# Patient Record
Sex: FEMALE | Race: WHITE | NOT HISPANIC OR LATINO | Employment: OTHER | URBAN - METROPOLITAN AREA
[De-identification: names, ages, dates, MRNs, and addresses within clinical notes are randomized per-mention and may not be internally consistent; named-entity substitution may affect disease eponyms.]

---

## 2017-01-03 ENCOUNTER — PRENATAL OFFICE VISIT (OUTPATIENT)
Dept: OBGYN | Facility: CLINIC | Age: 37
End: 2017-01-03
Payer: COMMERCIAL

## 2017-01-03 ENCOUNTER — TRANSFERRED RECORDS (OUTPATIENT)
Dept: HEALTH INFORMATION MANAGEMENT | Facility: CLINIC | Age: 37
End: 2017-01-03

## 2017-01-03 VITALS — BODY MASS INDEX: 26.15 KG/M2 | WEIGHT: 167 LBS | DIASTOLIC BLOOD PRESSURE: 68 MMHG | SYSTOLIC BLOOD PRESSURE: 100 MMHG

## 2017-01-03 DIAGNOSIS — O34.219 PREVIOUS CESAREAN DELIVERY, ANTEPARTUM: ICD-10-CM

## 2017-01-03 DIAGNOSIS — Z3A.23 23 WEEKS GESTATION OF PREGNANCY: ICD-10-CM

## 2017-01-03 DIAGNOSIS — O09.91 SUPERVISION OF HIGH RISK PREGNANCY IN FIRST TRIMESTER: Primary | ICD-10-CM

## 2017-01-03 PROCEDURE — 99207 ZZC PRENATAL VISIT: CPT | Performed by: OBSTETRICS & GYNECOLOGY

## 2017-01-03 NOTE — PROGRESS NOTES
Doing well today.  NO issues/concerns  +FM  Occ BH contractions; no vb/spotting/lof  RTC 4wks  Tubal ligation consent signed and scanned into chart

## 2017-01-03 NOTE — MR AVS SNAPSHOT
After Visit Summary   1/3/2017    Yas Beck    MRN: [id number]           Patient Information     Date Of Birth          1980        Visit Information        Provider Department      1/3/2017 9:30 AM Kayla Campos MD [facility] [place]        Today's Diagnoses     Supervision of high risk pregnancy in second trimester    -  1     Previous  delivery, antepartum         23 weeks gestation of pregnancy            Follow-ups after your visit        Follow-up notes from your care team     Return in about 4 weeks (around 2017) for Routine Prenatal Visit.      Your next 10 appointments already scheduled     2017  8:30 AM   Glucose Tolerance Test with [facility]   [facility] [place] ([facility] [place])    [address]  [address]  [place] [address] [phone]            2017  8:40 AM   ESTABLISHED PRENATAL with Kayla Campos MD   [facility] [place] ([facility] [place])    [address]  [address]  [place] [address] [phone]            Feb 10, 2017  9:30 AM   ESTABLISHED PRENATAL with Kayla Campos MD   [facility] [place] ([facility] [place])    [address]  [address]  [place] [address] [phone]            2017   Procedure with Kayla Campos MD    Birthplace (--)    [address] [place] [address], [address]  [place] [address] [phone]              Who to contact     If you have questions or need follow up information about today's clinic visit or your schedule please contact [facility] [place] directly at [phone].  Normal or non-critical lab and imaging results will be communicated to you by MyChart, letter or phone within 4 business days after the clinic has received the results. If you do not hear from us within 7 days, please contact the clinic through MyChart or phone. If you have a  "critical or abnormal lab result, we will notify you by phone as soon as possible.  Submit refill requests through [employer] or call your pharmacy and they will forward the refill request to us. Please allow 3 business days for your refill to be completed.          Additional Information About Your Visit        [employer] Information     [employer] lets you send messages to your doctor, view your test results, renew your prescriptions, schedule appointments and more. To sign up, go to [email or web address] . Click on \"Log in\" on the left side of the screen, which will take you to the Welcome page. Then click on \"Sign up Now\" on the right side of the page.     You will be asked to enter the access code listed below, as well as some personal information. Please follow the directions to create your username and password.     Your access code is: [email or web address]  Expires: 4/3/2017 10:23 AM     Your access code will  in 90 days. If you need help or a new code, please call your [place] clinic or [phone].        Care EveryWhere ID     This is your Care EveryWhere ID. This could be used by other organizations to access your [place] medical records  [id number]        Your Vitals Were     Last Period                   2016            Blood Pressure from Last 3 Encounters:   17 100/68   16 112/68   16 118/78    Weight from Last 3 Encounters:   17 167 lb (75.751 kg)   16 162 lb (73.483 kg)   16 162 lb (73.483 kg)              Today, you had the following     No orders found for display       Primary Care Provider Office Phone # Fax #    Lalo Lopez [phone]       [facility] [address] [address]        Thank you!     Thank you for choosing [facility] [place]  for your care. Our goal is always to provide you with excellent care. Hearing back from our patients is one way we can continue to improve our " services. Please take a few minutes to complete the written survey that you may receive in the mail after your visit with us. Thank you!             Your Updated Medication List - Protect others around you: Learn how to safely use, store and throw away your medicines at www.disposemymeds.org.          This list is accurate as of: 1/3/17 11:18 AM.  Always use your most recent med list.                   Brand Name Dispense Instructions for use    MAGNESIUM OXIDE PO          prenatal multivitamin  plus iron 27-0.8 MG Tabs per tablet      Take 1 tablet by mouth daily.    Indications: Pregnancy       vitamin B complex with vitamin C Tabs tablet      Take 1 tablet by mouth daily

## 2017-01-26 ENCOUNTER — PRENATAL OFFICE VISIT (OUTPATIENT)
Dept: OBGYN | Facility: CLINIC | Age: 37
End: 2017-01-26
Payer: COMMERCIAL

## 2017-01-26 VITALS — WEIGHT: 169 LBS | DIASTOLIC BLOOD PRESSURE: 66 MMHG | BODY MASS INDEX: 26.46 KG/M2 | SYSTOLIC BLOOD PRESSURE: 110 MMHG

## 2017-01-26 DIAGNOSIS — Z23 IMMUNIZATION DUE: ICD-10-CM

## 2017-01-26 DIAGNOSIS — O09.91 SUPERVISION OF HIGH RISK PREGNANCY IN FIRST TRIMESTER: Primary | ICD-10-CM

## 2017-01-26 DIAGNOSIS — Z36.9 ENCOUNTER FOR ANTENATAL SCREENING OF MOTHER: Primary | ICD-10-CM

## 2017-01-26 DIAGNOSIS — Z3A.26 26 WEEKS GESTATION OF PREGNANCY: ICD-10-CM

## 2017-01-26 DIAGNOSIS — O34.219 PREVIOUS CESAREAN DELIVERY, ANTEPARTUM: ICD-10-CM

## 2017-01-26 LAB
GLUCOSE 1H P 50 G GLC PO SERPL-MCNC: 83 MG/DL (ref 60–129)
HGB BLD-MCNC: 11.8 G/DL (ref 11.7–15.7)

## 2017-01-26 PROCEDURE — 82950 GLUCOSE TEST: CPT | Performed by: OBSTETRICS & GYNECOLOGY

## 2017-01-26 PROCEDURE — 90471 IMMUNIZATION ADMIN: CPT

## 2017-01-26 PROCEDURE — 36415 COLL VENOUS BLD VENIPUNCTURE: CPT | Performed by: OBSTETRICS & GYNECOLOGY

## 2017-01-26 PROCEDURE — 00000218 ZZHCL STATISTIC OBHBG - HEMOGLOBIN: Performed by: OBSTETRICS & GYNECOLOGY

## 2017-01-26 PROCEDURE — 99207 ZZC PRENATAL VISIT: CPT | Performed by: OBSTETRICS & GYNECOLOGY

## 2017-01-26 PROCEDURE — 90715 TDAP VACCINE 7 YRS/> IM: CPT

## 2017-01-26 NOTE — PROGRESS NOTES
Doing well today.  No issues/concerns  +FM  occ cramping and RL pain; no vb/spotting  Glucola, hgb and Tdap today  RTC 3wks

## 2017-02-10 ENCOUNTER — PRENATAL OFFICE VISIT (OUTPATIENT)
Dept: OBGYN | Facility: CLINIC | Age: 37
End: 2017-02-10
Payer: COMMERCIAL

## 2017-02-10 VITALS — WEIGHT: 170 LBS | BODY MASS INDEX: 26.62 KG/M2 | DIASTOLIC BLOOD PRESSURE: 74 MMHG | SYSTOLIC BLOOD PRESSURE: 118 MMHG

## 2017-02-10 DIAGNOSIS — O09.93 SUPERVISION OF HIGH RISK PREGNANCY IN THIRD TRIMESTER: Primary | ICD-10-CM

## 2017-02-10 DIAGNOSIS — Z3A.28 28 WEEKS GESTATION OF PREGNANCY: ICD-10-CM

## 2017-02-10 DIAGNOSIS — O34.219 PREVIOUS CESAREAN DELIVERY, ANTEPARTUM: ICD-10-CM

## 2017-02-10 PROCEDURE — 99207 ZZC PRENATAL VISIT: CPT | Performed by: OBSTETRICS & GYNECOLOGY

## 2017-02-10 NOTE — MR AVS SNAPSHOT
After Visit Summary   2/10/2017    Yas Beck    MRN: [id number]           Patient Information     Date Of Birth          1980        Visit Information        Provider Department      2/10/2017 9:30 AM Kayla Campos MD [facility] [place]        Today's Diagnoses     Supervision of high risk pregnancy in third trimester    -  1     28 weeks gestation of pregnancy         Previous  delivery, antepartum            Follow-ups after your visit        Follow-up notes from your care team     Return in about 2 weeks (around 2017) for Routine Prenatal Visit.      Your next 10 appointments already scheduled     2017  9:20 AM   ESTABLISHED PRENATAL with Kayla Campos MD   [facility] [place] ([facility] [place])    [address]  [place] [address]   [phone]            Mar 10, 2017 11:40 AM   ESTABLISHED PRENATAL with Kayla Campos MD   [facility] [place] ([facility] [place])    [address]  [place] [address]   [phone]            Mar 24, 2017 10:00 AM   ESTABLISHED PRENATAL with Kayla Campos MD   [facility] [place] ([facility] [place])    [address]  [place] [address]   [phone]            Mar 31, 2017 10:00 AM   ESTABLISHED PRENATAL with Kayla Campos MD   [facility] [place] ([facility] [place])    [address]  [place] [address]   [phone]            2017 11:00 AM   ESTABLISHED PRENATAL with Kayla Campos MD   [facility] [place] ([facility] [place])    [address]  [place] [address]   [phone]            2017 11:00 AM   ESTABLISHED PRENATAL with Kayla Campos MD   [facility] [place] ([facility] [place])    [address]  [place] [address]   [phone]            2017 11:00 AM   ESTABLISHED PRENATAL with Kayla Campos MD   [facility] ([facility] [place])    [address]  [place] [address]   [phone]            2017   Procedure with Kayla Campos MD    Birthplace (--)    [address]  [place] [address] [phone]              Who to contact     If you have questions or need follow up information about today's clinic visit or your schedule please contact [facility] [place] directly at [phone].  Normal or non-critical lab and imaging results will be communicated to you by MyChart, letter or phone within 4 business days after the clinic has received the results. If you do not hear from us within 7 days, please contact the clinic through [employer] or phone. If you have a critical or abnormal lab result, we will notify you by phone as soon as possible.  Submit refill requests through [email or web address] or call your pharmacy and they will forward the refill request to us. Please allow 3 business days for your refill to be completed.          Additional Information About Your Visit        [employer] Information     [email or web address] lets you send messages to your doctor, view your test results, renew your prescriptions, schedule appointments and more. To sign up, go to [email or web address] . Click on \"Log in\" on the left side of the screen, which will take you to the Welcome page. Then click on \"Sign up Now\" on the right side of the page.     You will be asked to enter the access code listed below, as well as some personal information. Please follow the directions to create your username and password.     Your access code is: [email or web address]  Expires: 4/3/2017 10:23 AM     Your access code will  in 90 days. If you need help or a new code, please call your [place] clinic or [phone].        Care " EveryWhere ID     This is your Care EveryWhere ID. This could be used by other organizations to access your [place] medical records  [id number]        Your Vitals Were     Last Period                   07/24/2016            Blood Pressure from Last 3 Encounters:   02/10/17 118/74   01/26/17 110/66   01/03/17 100/68    Weight from Last 3 Encounters:   02/10/17 170 lb (77.111 kg)   01/26/17 169 lb (76.658 kg)   01/03/17 167 lb (75.751 kg)              Today, you had the following     No orders found for display       Primary Care Provider Office Phone # Fax #    Lalo Lopez [phone]       [facility] [address] [address]        Thank you!     Thank you for choosing [facility] [place]  for your care. Our goal is always to provide you with excellent care. Hearing back from our patients is one way we can continue to improve our services. Please take a few minutes to complete the written survey that you may receive in the mail after your visit with us. Thank you!             Your Updated Medication List - Protect others around you: Learn how to safely use, store and throw away your medicines at www.disposemymeds.org.          This list is accurate as of: 2/10/17  9:55 AM.  Always use your most recent med list.                   Brand Name Dispense Instructions for use    MAGNESIUM OXIDE PO          prenatal multivitamin  plus iron 27-0.8 MG Tabs per tablet      Take 1 tablet by mouth daily.    Indications: Pregnancy       vitamin B complex with vitamin C Tabs tablet      Take 1 tablet by mouth daily

## 2017-02-10 NOTE — PROGRESS NOTES
Syphilis is a sexually transmitted disease that can cause birth defects in the babies of untreated mothers. Every pregnant patient is tested for syphilis early in each pregnancy as part of the routine lab work. The [place] [facility] has seen an increase in the rate of syphilis in [place]. The [facility] now recommends testing for syphilis 3 times during a pregnancy, the new prenatal visit, 28 weeks and when admitted for delivery. Patient declines lab testing for syphilis.

## 2017-02-10 NOTE — PROGRESS NOTES
Doing well today.  No issues/concerns  +FM  No ctx/cramping/vb/lof  Leave for [place] next week and [place] the week after!  RTC 2wks

## 2017-02-23 ENCOUNTER — PRENATAL OFFICE VISIT (OUTPATIENT)
Dept: OBGYN | Facility: CLINIC | Age: 37
End: 2017-02-23
Payer: COMMERCIAL

## 2017-02-23 VITALS — WEIGHT: 174 LBS | BODY MASS INDEX: 27.25 KG/M2 | SYSTOLIC BLOOD PRESSURE: 122 MMHG | DIASTOLIC BLOOD PRESSURE: 80 MMHG

## 2017-02-23 DIAGNOSIS — Z3A.30 30 WEEKS GESTATION OF PREGNANCY: ICD-10-CM

## 2017-02-23 DIAGNOSIS — O34.219 PREVIOUS CESAREAN DELIVERY, ANTEPARTUM: ICD-10-CM

## 2017-02-23 DIAGNOSIS — O09.93 SUPERVISION OF HIGH RISK PREGNANCY IN THIRD TRIMESTER: Primary | ICD-10-CM

## 2017-02-23 PROCEDURE — 99207 ZZC PRENATAL VISIT: CPT | Performed by: OBSTETRICS & GYNECOLOGY

## 2017-02-23 NOTE — PROGRESS NOTES
Doing well today.  +FM  Occ BH contractions; no vb/spotting  Leaves for [place] on Sunday; has compression stockings  RTC 2wks

## 2017-02-23 NOTE — MR AVS SNAPSHOT
After Visit Summary   2017    Yas Beck    MRN: [id number]           Patient Information     Date Of Birth          1980        Visit Information        Provider Department      2017 10:50 AM Kayla Campos MD [facility] [place]        Today's Diagnoses     Supervision of high risk pregnancy in third trimester    -  1    30 weeks gestation of pregnancy        Previous  delivery, antepartum           Follow-ups after your visit        Follow-up notes from your care team     Return in about 2 weeks (around 3/9/2017) for Routine Prenatal Visit.      Your next 10 appointments already scheduled     Mar 09, 2017 10:20 AM CST   ESTABLISHED PRENATAL with Kayla Campos MD   [facility] [place] ([facility] [place])    [address]  [place] [address]   [phone]            Mar 30, 2017 10:20 AM CDT   ESTABLISHED PRENATAL with Kayla Campos MD   [facility] [place] ([facility] [place])    [address]  [place] [address]   [phone]            2017 10:50 AM CDT   ESTABLISHED PRENATAL with Kayla Campos MD   [facility] [place] ([facility] [place])    [address]  [place] [address]   [phone]            2017 10:50 AM CDT   ESTABLISHED PRENATAL with Kayla Campos MD   [facility] [place] ([facility] [place])    [address]  [place] [address]   [phone]            2017 10:00 AM CDT   ESTABLISHED PRENATAL with Kayla Campos MD   [facility] [place] ([facility] [place])    [address]  [place] [address]   [phone]            2017   Procedure with Kayla Campos MD    Birthplace (--)    [address]  [place] [address]   [phone]     "          Who to contact     If you have questions or need follow up information about today's clinic visit or your schedule please contact [facility] [place] directly at [phone].  Normal or non-critical lab and imaging results will be communicated to you by MyChart, letter or phone within 4 business days after the clinic has received the results. If you do not hear from us within 7 days, please contact the clinic through MyChart or phone. If you have a critical or abnormal lab result, we will notify you by phone as soon as possible.  Submit refill requests through [employer] or call your pharmacy and they will forward the refill request to us. Please allow 3 business days for your refill to be completed.          Additional Information About Your Visit        [employer] Information     [employer] lets you send messages to your doctor, view your test results, renew your prescriptions, schedule appointments and more. To sign up, go to [email or web address] . Click on \"Log in\" on the left side of the screen, which will take you to the Welcome page. Then click on \"Sign up Now\" on the right side of the page.     You will be asked to enter the access code listed below, as well as some personal information. Please follow the directions to create your username and password.     Your access code is: [email or web address]  Expires: 4/3/2017 10:23 AM     Your access code will  in 90 days. If you need help or a new code, please call your [place] clinic or [phone].        Care EveryWhere ID     This is your Care EveryWhere ID. This could be used by other organizations to access your [place] medical records  [id number]        Your Vitals Were     Last Period BMI (Body Mass Index)                2016 27.25 kg/m2           Blood Pressure from Last 3 Encounters:   17 122/80   02/10/17 118/74   17 110/66    Weight from Last 3 Encounters:   17 174 lb (78.9 kg)   02/10/17 170 lb (77.1 kg) "   01/26/17 169 lb (76.7 kg)              Today, you had the following     No orders found for display       Primary Care Provider Office Phone # Fax #    Lalo Lopez [phone]       [facility] [address] [address]        Thank you!     Thank you for choosing [facility] [place]  for your care. Our goal is always to provide you with excellent care. Hearing back from our patients is one way we can continue to improve our services. Please take a few minutes to complete the written survey that you may receive in the mail after your visit with us. Thank you!             Your Updated Medication List - Protect others around you: Learn how to safely use, store and throw away your medicines at www.disposemymeds.org.          This list is accurate as of: 2/23/17 11:01 AM.  Always use your most recent med list.                   Brand Name Dispense Instructions for use    MAGNESIUM OXIDE PO          prenatal multivitamin  plus iron 27-0.8 MG Tabs per tablet      Take 1 tablet by mouth daily.    Indications: Pregnancy       vitamin B complex with vitamin C Tabs tablet      Take 1 tablet by mouth daily

## 2017-03-09 ENCOUNTER — PRENATAL OFFICE VISIT (OUTPATIENT)
Dept: OBGYN | Facility: CLINIC | Age: 37
End: 2017-03-09
Payer: COMMERCIAL

## 2017-03-09 VITALS — WEIGHT: 172 LBS | BODY MASS INDEX: 26.94 KG/M2 | SYSTOLIC BLOOD PRESSURE: 120 MMHG | DIASTOLIC BLOOD PRESSURE: 76 MMHG

## 2017-03-09 DIAGNOSIS — O34.219 PREVIOUS CESAREAN DELIVERY, ANTEPARTUM: ICD-10-CM

## 2017-03-09 DIAGNOSIS — Z3A.32 32 WEEKS GESTATION OF PREGNANCY: ICD-10-CM

## 2017-03-09 DIAGNOSIS — O09.93 SUPERVISION OF HIGH RISK PREGNANCY IN THIRD TRIMESTER: Primary | ICD-10-CM

## 2017-03-09 PROCEDURE — 99207 ZZC PRENATAL VISIT: CPT | Performed by: OBSTETRICS & GYNECOLOGY

## 2017-03-09 NOTE — PROGRESS NOTES
Doing well today.  No issues/concerns  Occ BH contractions  No vb/spotting/lof  +FM  Had good trip to [facility] on Monday; planning trip to [place] in 2 weeks  RTC 2wks

## 2017-03-09 NOTE — MR AVS SNAPSHOT
After Visit Summary   3/9/2017    Yas Beck    MRN: [id number]           Patient Information     Date Of Birth          1980        Visit Information        Provider Department      3/9/2017 10:20 AM Kayla Campos MD [facility] [place]        Today's Diagnoses     Supervision of high risk pregnancy in third trimester    -  1    Previous  delivery, antepartum        32 weeks gestation of pregnancy           Follow-ups after your visit        Follow-up notes from your care team     Return in about 2 weeks (around 3/23/2017) for Routine Prenatal Visit.      Your next 10 appointments already scheduled     Mar 30, 2017 10:20 AM CDT   ESTABLISHED PRENATAL with Kayla Campos MD   [facility] [place] ([facility] [place])    [address]  [place] [address]   [phone]            2017 10:50 AM CDT   ESTABLISHED PRENATAL with Kayla Campos MD   [facility] [place] ([facility] [place])    [address]  [place] [address]   [phone]            2017 10:50 AM CDT   ESTABLISHED PRENATAL with Kayla Campos MD   [facility] [place] ([facility] [place])    [address]  [place] [address]   [phone]            2017 10:00 AM CDT   ESTABLISHED PRENATAL with Kayla Campos MD   [facility] [place] ([facility] [place])    [address]  [place] [address]   [phone]            2017   Procedure with Kayla Campos MD    Birthplace (--)    [address]  [place] [address]   [phone]              Who to contact     If you have questions or need follow up information about today's clinic visit or your schedule please contact [facility] [place] directly at [phone].  Normal or non-critical lab and  "imaging results will be communicated to you by MyChart, letter or phone within 4 business days after the clinic has received the results. If you do not hear from us within 7 days, please contact the clinic through [employer] or phone. If you have a critical or abnormal lab result, we will notify you by phone as soon as possible.  Submit refill requests through [employer] or call your pharmacy and they will forward the refill request to us. Please allow 3 business days for your refill to be completed.          Additional Information About Your Visit        [employer] Information     [employer] lets you send messages to your doctor, view your test results, renew your prescriptions, schedule appointments and more. To sign up, go to [email or web address] . Click on \"Log in\" on the left side of the screen, which will take you to the Welcome page. Then click on \"Sign up Now\" on the right side of the page.     You will be asked to enter the access code listed below, as well as some personal information. Please follow the directions to create your username and password.     Your access code is: [email or web address]  Expires: 4/3/2017 10:23 AM     Your access code will  in 90 days. If you need help or a new code, please call your [place] clinic or [phone].        Care EveryWhere ID     This is your Care EveryWhere ID. This could be used by other organizations to access your [place] medical records  [id number]        Your Vitals Were     Last Period BMI (Body Mass Index)                2016 26.94 kg/m2           Blood Pressure from Last 3 Encounters:   17 120/76   17 122/80   02/10/17 118/74    Weight from Last 3 Encounters:   17 172 lb (78 kg)   17 174 lb (78.9 kg)   02/10/17 170 lb (77.1 kg)              Today, you had the following     No orders found for display       Primary Care Provider Office Phone # Fax #    Lalo Lopez [phone]       [facility] " [address]  [facility] [address]        Thank you!     Thank you for choosing [facility] [place]  for your care. Our goal is always to provide you with excellent care. Hearing back from our patients is one way we can continue to improve our services. Please take a few minutes to complete the written survey that you may receive in the mail after your visit with us. Thank you!             Your Updated Medication List - Protect others around you: Learn how to safely use, store and throw away your medicines at www.disposemymeds.org.          This list is accurate as of: 3/9/17 10:45 AM.  Always use your most recent med list.                   Brand Name Dispense Instructions for use    MAGNESIUM OXIDE PO          prenatal multivitamin  plus iron 27-0.8 MG Tabs per tablet      Take 1 tablet by mouth daily.    Indications: Pregnancy       vitamin B complex with vitamin C Tabs tablet      Take 1 tablet by mouth daily

## 2017-03-28 ENCOUNTER — TELEPHONE (OUTPATIENT)
Dept: NURSING | Facility: CLINIC | Age: 37
End: 2017-03-28

## 2017-03-28 NOTE — TELEPHONE ENCOUNTER
35w2d; PATEL: 4/30/17  Pt calling since last night hasn't felt much fetal movement, nothing like her usual. Denies vaginal bleeding, denies cramping, denies contractions, denies LOF. Recommended to go to [facility]. Pt verbalized understanding stating her ETA to [facility] would be an hr, recommended sooner rather than later. [facility] informed, Dr. Campos informed.      Closing encounter.

## 2017-03-30 ENCOUNTER — PRENATAL OFFICE VISIT (OUTPATIENT)
Dept: OBGYN | Facility: CLINIC | Age: 37
End: 2017-03-30
Payer: COMMERCIAL

## 2017-03-30 VITALS — BODY MASS INDEX: 27.25 KG/M2 | SYSTOLIC BLOOD PRESSURE: 112 MMHG | DIASTOLIC BLOOD PRESSURE: 68 MMHG | WEIGHT: 174 LBS

## 2017-03-30 DIAGNOSIS — Z36.85 SCREENING, ANTENATAL, FOR STREPTOCOCCUS B: ICD-10-CM

## 2017-03-30 DIAGNOSIS — O09.93 SUPERVISION OF HIGH RISK PREGNANCY IN THIRD TRIMESTER: Primary | ICD-10-CM

## 2017-03-30 DIAGNOSIS — O34.219 PREVIOUS CESAREAN DELIVERY, ANTEPARTUM: ICD-10-CM

## 2017-03-30 DIAGNOSIS — Z3A.35 35 WEEKS GESTATION OF PREGNANCY: ICD-10-CM

## 2017-03-30 PROCEDURE — 87653 STREP B DNA AMP PROBE: CPT | Performed by: OBSTETRICS & GYNECOLOGY

## 2017-03-30 PROCEDURE — 99207 ZZC PRENATAL VISIT: CPT | Performed by: OBSTETRICS & GYNECOLOGY

## 2017-03-30 NOTE — MR AVS SNAPSHOT
After Visit Summary   3/30/2017    Yas Beck    MRN: [id number]           Patient Information     Date Of Birth          1980        Visit Information        Provider Department      3/30/2017 10:20 AM Kayla aCmpos MD [facility] [place]        Today's Diagnoses     Supervision of high risk pregnancy in third trimester    -  1    Screening, , for Streptococcus B        Previous  delivery, antepartum        35 weeks gestation of pregnancy           Follow-ups after your visit        Follow-up notes from your care team     Return in about 1 week (around 2017) for Routine Prenatal Visit.      Your next 10 appointments already scheduled     2017 10:50 AM CDT   ESTABLISHED PRENATAL with Kayla Campos MD   [facility] [place] ([facility] [place])    [address]  [address]  [place] [address]   [phone]            2017 10:50 AM CDT   ESTABLISHED PRENATAL with Kayla Campos MD   [facility] [place] ([facility] [place])    [address]  [place] [address]   [phone]            2017  9:20 AM CDT   ESTABLISHED PRENATAL with Kayla Campos MD   [facility] [place] ([facility] [place])    [address]  [place] [address]   [phone]            2017   Procedure with Kayla Campos MD    Birthplace (--)    [address] [place] [address], [address]  [place] [address]   [phone]              Who to contact     If you have questions or need follow up information about today's clinic visit or your schedule please contact [facility] [place] directly at [phone].  Normal or non-critical lab and imaging results will be communicated to you by MyChart, letter or phone within 4 business days after the clinic has received the results. If you do not hear from us within 7 days,  "please contact the clinic through [employer] or phone. If you have a critical or abnormal lab result, we will notify you by phone as soon as possible.  Submit refill requests through [employer] or call your pharmacy and they will forward the refill request to us. Please allow 3 business days for your refill to be completed.          Additional Information About Your Visit        [employer] Information     [employer] lets you send messages to your doctor, view your test results, renew your prescriptions, schedule appointments and more. To sign up, go to [email or web address] . Click on \"Log in\" on the left side of the screen, which will take you to the Welcome page. Then click on \"Sign up Now\" on the right side of the page.     You will be asked to enter the access code listed below, as well as some personal information. Please follow the directions to create your username and password.     Your access code is: [email or web address]  Expires: 4/3/2017 11:23 AM     Your access code will  in 90 days. If you need help or a new code, please call your [place] clinic or [phone].        Care EveryWhere ID     This is your Care EveryWhere ID. This could be used by other organizations to access your [place] medical records  [id number]        Your Vitals Were     Last Period BMI (Body Mass Index)                2016 27.25 kg/m2           Blood Pressure from Last 3 Encounters:   17 112/68   17 121/70   17 120/76    Weight from Last 3 Encounters:   17 174 lb (78.9 kg)   17 172 lb (78 kg)   17 174 lb (78.9 kg)              We Performed the Following     Group B strep PCR        Primary Care Provider Office Phone # Fax #    Lalo Lopez [phone]       [facility] [address] [address]        Thank you!     Thank you for choosing [facility] [place]  for your care. Our goal is always to provide you with excellent care. " Hearing back from our patients is one way we can continue to improve our services. Please take a few minutes to complete the written survey that you may receive in the mail after your visit with us. Thank you!             Your Updated Medication List - Protect others around you: Learn how to safely use, store and throw away your medicines at www.disposemymeds.org.          This list is accurate as of: 3/30/17 10:57 AM.  Always use your most recent med list.                   Brand Name Dispense Instructions for use    MAGNESIUM OXIDE PO          prenatal multivitamin  plus iron 27-0.8 MG Tabs per tablet      Take 1 tablet by mouth daily.    Indications: Pregnancy       vitamin B complex with vitamin C Tabs tablet      Take 1 tablet by mouth daily

## 2017-03-30 NOTE — PROGRESS NOTES
Doing well today.  No issues/concerns today  Was seen in [facility] for decreased FM earlier this week --normal FM since that time  No ctx/cramping/vb/lof  GBS collected today  Labor precautions discussed   RTC 1wk

## 2017-03-31 LAB
GP B STREP DNA SPEC QL NAA+PROBE: NORMAL
SPECIMEN SOURCE: NORMAL

## 2017-04-06 ENCOUNTER — PRENATAL OFFICE VISIT (OUTPATIENT)
Dept: OBGYN | Facility: CLINIC | Age: 37
End: 2017-04-06
Payer: COMMERCIAL

## 2017-04-06 VITALS — WEIGHT: 175 LBS | SYSTOLIC BLOOD PRESSURE: 118 MMHG | BODY MASS INDEX: 27.41 KG/M2 | DIASTOLIC BLOOD PRESSURE: 74 MMHG

## 2017-04-06 DIAGNOSIS — Z3A.36 36 WEEKS GESTATION OF PREGNANCY: ICD-10-CM

## 2017-04-06 DIAGNOSIS — O09.93 SUPERVISION OF HIGH RISK PREGNANCY IN THIRD TRIMESTER: Primary | ICD-10-CM

## 2017-04-06 PROCEDURE — 99207 ZZC PRENATAL VISIT: CPT | Performed by: OBSTETRICS & GYNECOLOGY

## 2017-04-06 NOTE — MR AVS SNAPSHOT
After Visit Summary   4/6/2017    Yas Beck    MRN: [id number]           Patient Information     Date Of Birth          1980        Visit Information        Provider Department      4/6/2017 10:50 AM Kayla Campos MD [facility] [place]        Today's Diagnoses     Supervision of high risk pregnancy in third trimester    -  1    36 weeks gestation of pregnancy           Follow-ups after your visit        Follow-up notes from your care team     Return in about 1 week (around 4/13/2017) for Routine Prenatal Visit.      Your next 10 appointments already scheduled     Apr 13, 2017 10:50 AM CDT   ESTABLISHED PRENATAL with Kayla Campos MD   [facility] [place] ([facility] [place])    [address]  [address]  [place] [address] [phone]            Apr 21, 2017  9:20 AM CDT   ESTABLISHED PRENATAL with Kayla Campos MD   [facility] [place] ([facility] [place])    [address]  [address]  [place] [address] [phone]            Apr 26, 2017   Procedure with Kayla Campos MD    Birthplace (--)    [address]  [place] [address]   [phone]              Who to contact     If you have questions or need follow up information about today's clinic visit or your schedule please contact [facility] [place] directly at [phone].  Normal or non-critical lab and imaging results will be communicated to you by MyChart, letter or phone within 4 business days after the clinic has received the results. If you do not hear from us within 7 days, please contact the clinic through MyChart or phone. If you have a critical or abnormal lab result, we will notify you by phone as soon as possible.  Submit refill requests through [employer] or call your pharmacy and they will forward the refill request to us. Please allow 3 business days for your refill to be completed.     "      Additional Information About Your Visit        MyChart Information     [employer] lets you send messages to your doctor, view your test results, renew your prescriptions, schedule appointments and more. To sign up, go to [email or web address] . Click on \"Log in\" on the left side of the screen, which will take you to the Welcome page. Then click on \"Sign up Now\" on the right side of the page.     You will be asked to enter the access code listed below, as well as some personal information. Please follow the directions to create your username and password.     Your access code is: [email or web address]  Expires: 2017 11:08 AM     Your access code will  in 90 days. If you need help or a new code, please call your [place] clinic or [phone].        Care EveryWhere ID     This is your Care EveryWhere ID. This could be used by other organizations to access your [place] medical records  [id number]        Your Vitals Were     Last Period BMI (Body Mass Index)                2016 27.41 kg/m2           Blood Pressure from Last 3 Encounters:   17 118/74   17 112/68   17 121/70    Weight from Last 3 Encounters:   17 175 lb (79.4 kg)   17 174 lb (78.9 kg)   17 172 lb (78 kg)              Today, you had the following     No orders found for display       Primary Care Provider Office Phone # Fax #    Lalo Lopez [phone]       [facility] [address] [address]        Thank you!     Thank you for choosing [facility] [place]  for your care. Our goal is always to provide you with excellent care. Hearing back from our patients is one way we can continue to improve our services. Please take a few minutes to complete the written survey that you may receive in the mail after your visit with us. Thank you!             Your Updated Medication List - Protect others around you: Learn how to safely use, store and " throw away your medicines at www.disposemymeds.org.          This list is accurate as of: 4/6/17 11:09 AM.  Always use your most recent med list.                   Brand Name Dispense Instructions for use    MAGNESIUM OXIDE PO          prenatal multivitamin  plus iron 27-0.8 MG Tabs per tablet      Take 1 tablet by mouth daily.    Indications: Pregnancy       vitamin B complex with vitamin C Tabs tablet      Take 1 tablet by mouth daily

## 2017-04-13 ENCOUNTER — PRENATAL OFFICE VISIT (OUTPATIENT)
Dept: OBGYN | Facility: CLINIC | Age: 37
End: 2017-04-13
Payer: COMMERCIAL

## 2017-04-13 VITALS — SYSTOLIC BLOOD PRESSURE: 118 MMHG | WEIGHT: 175.4 LBS | DIASTOLIC BLOOD PRESSURE: 80 MMHG | BODY MASS INDEX: 27.47 KG/M2

## 2017-04-13 DIAGNOSIS — Z3A.37 37 WEEKS GESTATION OF PREGNANCY: ICD-10-CM

## 2017-04-13 DIAGNOSIS — O34.219 PREVIOUS CESAREAN DELIVERY, ANTEPARTUM: ICD-10-CM

## 2017-04-13 DIAGNOSIS — O09.93 SUPERVISION OF HIGH RISK PREGNANCY IN THIRD TRIMESTER: Primary | ICD-10-CM

## 2017-04-13 PROCEDURE — 99207 ZZC PRENATAL VISIT: CPT | Performed by: OBSTETRICS & GYNECOLOGY

## 2017-04-13 NOTE — PROGRESS NOTES
Doing well today.  +FM  No ctx/cramping/vb/lof  No concerns  Reviewed expected course of c/s in 2wks; all questions/concerns answered and addressed  RTC 1wk

## 2017-04-13 NOTE — MR AVS SNAPSHOT
After Visit Summary   2017    Yas Beck    MRN: [id number]           Patient Information     Date Of Birth          1980        Visit Information        Provider Department      2017 10:50 AM Kayla Campos MD [facility] [place]        Today's Diagnoses     Supervision of high risk pregnancy in third trimester    -  1    Previous  delivery, antepartum        37 weeks gestation of pregnancy           Follow-ups after your visit        Follow-up notes from your care team     Return in about 1 week (around 2017) for Routine Prenatal Visit.      Your next 10 appointments already scheduled     2017  9:20 AM CDT   ESTABLISHED PRENATAL with Kayla Campos MD   [facility] [place] ([facility] [place])    [address]  [address]  [place] [address] [phone]            2017   Procedure with Kayla Campos MD    Birthplace (--)    [address]  [place] [address] [phone]              Who to contact     If you have questions or need follow up information about today's clinic visit or your schedule please contact [facility] [place] directly at [phone].  Normal or non-critical lab and imaging results will be communicated to you by MyChart, letter or phone within 4 business days after the clinic has received the results. If you do not hear from us within 7 days, please contact the clinic through [employer] or phone. If you have a critical or abnormal lab result, we will notify you by phone as soon as possible.  Submit refill requests through [employer] or call your pharmacy and they will forward the refill request to us. Please allow 3 business days for your refill to be completed.          Additional Information About Your Visit        [employer] Information     [employer] lets you send messages to your doctor, view your test results, renew your prescriptions, schedule  "appointments and more. To sign up, go to [email or web address] . Click on \"Log in\" on the left side of the screen, which will take you to the Welcome page. Then click on \"Sign up Now\" on the right side of the page.     You will be asked to enter the access code listed below, as well as some personal information. Please follow the directions to create your username and password.     Your access code is: [email or web address]  Expires: 2017 11:08 AM     Your access code will  in 90 days. If you need help or a new code, please call your [place] clinic or [phone].        Care EveryWhere ID     This is your Care EveryWhere ID. This could be used by other organizations to access your [place] medical records  [id number]        Your Vitals Were     Last Period BMI (Body Mass Index)                2016 27.47 kg/m2           Blood Pressure from Last 3 Encounters:   17 118/80   17 118/74   17 112/68    Weight from Last 3 Encounters:   17 175 lb 6.4 oz (79.6 kg)   17 175 lb (79.4 kg)   17 174 lb (78.9 kg)              Today, you had the following     No orders found for display       Primary Care Provider Office Phone # Fax #    Lalo Lopez [phone]       [facility] [address] [address]        Thank you!     Thank you for choosing [facility]  for your care. Our goal is always to provide you with excellent care. Hearing back from our patients is one way we can continue to improve our services. Please take a few minutes to complete the written survey that you may receive in the mail after your visit with us. Thank you!             Your Updated Medication List - Protect others around you: Learn how to safely use, store and throw away your medicines at www.disposemymeds.org.          This list is accurate as of: 17 11:09 AM.  Always use your most recent med list.                   Brand " Name Dispense Instructions for use    MAGNESIUM OXIDE PO          prenatal multivitamin  plus iron 27-0.8 MG Tabs per tablet      Take 1 tablet by mouth daily.    Indications: Pregnancy       vitamin B complex with vitamin C Tabs tablet      Take 1 tablet by mouth daily

## 2017-04-21 ENCOUNTER — PRENATAL OFFICE VISIT (OUTPATIENT)
Dept: OBGYN | Facility: CLINIC | Age: 37
End: 2017-04-21
Payer: COMMERCIAL

## 2017-04-21 VITALS — DIASTOLIC BLOOD PRESSURE: 70 MMHG | BODY MASS INDEX: 27.57 KG/M2 | WEIGHT: 176 LBS | SYSTOLIC BLOOD PRESSURE: 110 MMHG

## 2017-04-21 DIAGNOSIS — Z3A.39 39 WEEKS GESTATION OF PREGNANCY: ICD-10-CM

## 2017-04-21 DIAGNOSIS — O34.219 PREVIOUS CESAREAN DELIVERY, ANTEPARTUM: ICD-10-CM

## 2017-04-21 DIAGNOSIS — O09.91 SUPERVISION OF HIGH RISK PREGNANCY IN FIRST TRIMESTER: Primary | ICD-10-CM

## 2017-04-21 PROCEDURE — 99207 ZZC PRENATAL VISIT: CPT | Performed by: OBSTETRICS & GYNECOLOGY

## 2017-04-21 NOTE — MR AVS SNAPSHOT
"              After Visit Summary   2017    Yas Beck    MRN: [id number]           Patient Information     Date Of Birth          1980        Visit Information        Provider Department      2017 9:20 AM Kayla Campos MD [facility] [place]        Today's Diagnoses     Supervision of high risk pregnancy in third trimester    -  1    39 weeks gestation of pregnancy        Previous  delivery, antepartum           Follow-ups after your visit        Your next 10 appointments already scheduled     2017   Procedure with Kayla Campos MD    Birthplace (--)    [address]  [facility] [place] [facility] [place] [address]   [phone]            2017  9:30 AM CDT   Post Partum with Kayla Campos MD   [facility] [place] ([facility] [place])    [address]  [address]  [place] [address] [phone]              Who to contact     If you have questions or need follow up information about today's clinic visit or your schedule please contact [facility] [place] directly at [phone].  Normal or non-critical lab and imaging results will be communicated to you by [employer], letter or phone within 4 business days after the clinic has received the results. If you do not hear from us within 7 days, please contact the clinic through [employer] or phone. If you have a critical or abnormal lab result, we will notify you by phone as soon as possible.  Submit refill requests through [email or web address] or call your pharmacy and they will forward the refill request to us. Please allow 3 business days for your refill to be completed.          Additional Information About Your Visit        [employer] Information     [email or web address] lets you send messages to your doctor, view your test results, renew your prescriptions, schedule appointments and more. To sign up, go to [email or web address] . Click on \"Log in\" on the left side of the screen, which " "will take you to the Welcome page. Then click on \"Sign up Now\" on the right side of the page.     You will be asked to enter the access code listed below, as well as some personal information. Please follow the directions to create your username and password.     Your access code is: [email or web address]  Expires: 2017 11:08 AM     Your access code will  in 90 days. If you need help or a new code, please call your [place] clinic or [phone].        Care EveryWhere ID     This is your Care EveryWhere ID. This could be used by other organizations to access your [place] medical records  [id number]        Your Vitals Were     Last Period BMI (Body Mass Index)                2016 27.57 kg/m2           Blood Pressure from Last 3 Encounters:   17 110/70   17 118/80   17 118/74    Weight from Last 3 Encounters:   17 176 lb (79.8 kg)   17 175 lb 6.4 oz (79.6 kg)   17 175 lb (79.4 kg)              Today, you had the following     No orders found for display       Primary Care Provider Office Phone # Fax #    Lalo Lopez [phone]       [facility] [address] [address]        Thank you!     Thank you for choosing [facility]  for your care. Our goal is always to provide you with excellent care. Hearing back from our patients is one way we can continue to improve our services. Please take a few minutes to complete the written survey that you may receive in the mail after your visit with us. Thank you!             Your Updated Medication List - Protect others around you: Learn how to safely use, store and throw away your medicines at www.disposemymeds.org.          This list is accurate as of: 17  9:48 AM.  Always use your most recent med list.                   Brand Name Dispense Instructions for use    MAGNESIUM OXIDE PO          prenatal multivitamin  plus iron 27-0.8 MG Tabs per tablet    "   Take 1 tablet by mouth daily.    Indications: Pregnancy       vitamin B complex with vitamin C Tabs tablet      Take 1 tablet by mouth daily

## 2017-04-21 NOTE — PROGRESS NOTES
Doing well today.  No issues/concerns  No ctx/cramping/vb/lof  +FM  C/S discussed; discussed salpingectomy as opposed to BTL

## 2017-04-25 NOTE — H&P
[facility]    History and Physical  Obstetrics and Gynecology     Date of Admission:  (Not on file)    Assessment & Plan   Yas Beck is a 36 year old female who presents for Repeat  section and bilateral salpingectomy.     ASSESSMENT:   IUP @ 39w2d  Hx 2 prior  sections; desires permanent sterilzation  Uncomplicated pregnancy      PLAN:   Scheduled for  section.  No questions.      Kayla Campos    History of Present Illness   Yas Beck is a 36 year old female  39w2d  Estimated Date of Delivery: 2017 is calculated from Patient's last menstrual period was 2016. [facility] Repeat  section and bilateral salpingectomy for sterilization.    PRENATAL COURSE  Prenatal course was essentially uncomplicated      Recent Labs   Lab Test  16   1108   ABO  B   RH   Pos   AS  Neg     Rhogam not indicated   Recent Labs   Lab Test  17   1131  16   1109   HEPBANG   --   Nonreactive   HIAGAB   --   Nonreactive   HIV-1 p24 Ag & HIV-1/HIV-2 Ab Not Detected     GBS  Negative  No GBS DNA detected, presumed negative for GBS or number of bacteria may be   below the limit of detection of the assay.   Assay performed on incubated broth culture of specimen using [employer] real-time   PCR.     --    RUQIGG   --   14         Prior to Admission Medications   Cannot display prior to admission medications because the patient has not been admitted in this contact.     Allergies   No Known Allergies      Immunization History   Immunization History   Administered Date(s) Administered     Influenza Vaccine IM 3yrs+ 4 Valent IIV4 2016     TDAP Vaccine (Adacel) 2017     TDAP Vaccine (Boostrix) 2012       Past Medical History:   Diagnosis Date     Abnormal Pap smear of cervix     A couple abnormal PAP's greater than 8 yrs ago, had a colposcopy. [id number] Negative.     HSV-1 infection         Positive  HSV-1 test, no cold sore sx's     IUD (intrauterine device) in place 2015    Mirena     Migraine     followed by neurology     Subchorionic bleed 2012    on ultrasound at 8 weeks gestation (1.8 cm)       Past Surgical History:   Procedure Laterality Date      SECTION        SECTION  2012    Procedure:  SECTION;  REPEAT  SECTION;  Surgeon: Lalo Lopez MD;  Location: [facility]       Clinic vitals from today:  /70     Abdomen: gravid, single vertex fetus, non-tender, EFW 8 lbs    Constitutional: healthy, alert, active and no distress   Extremities: NT, no edema  Neurologic: Awake, alert, oriented x3  Neuropsychiatric: General: normal, calm and normal eye contact  Heart: Regular rate and rhythm  Lungs: clear to ausculation bilaterally    Kayla Campos MD

## 2017-04-26 ENCOUNTER — HOSPITAL ENCOUNTER (INPATIENT)
Facility: CLINIC | Age: 37
LOS: 3 days | Discharge: HOME HEALTH | End: 2017-04-29
Attending: OBSTETRICS & GYNECOLOGY | Admitting: OBSTETRICS & GYNECOLOGY
Payer: COMMERCIAL

## 2017-04-26 ENCOUNTER — ANESTHESIA EVENT (OUTPATIENT)
Dept: OBGYN | Facility: CLINIC | Age: 37
End: 2017-04-26
Payer: COMMERCIAL

## 2017-04-26 ENCOUNTER — ANESTHESIA (OUTPATIENT)
Dept: OBGYN | Facility: CLINIC | Age: 37
End: 2017-04-26
Payer: COMMERCIAL

## 2017-04-26 DIAGNOSIS — Z98.891 STATUS POST CESAREAN DELIVERY: Primary | ICD-10-CM

## 2017-04-26 LAB
ABO + RH BLD: NORMAL
ABO + RH BLD: NORMAL
BLD GP AB SCN SERPL QL: NORMAL
BLOOD BANK CMNT PATIENT-IMP: NORMAL
HGB BLD-MCNC: 13.5 G/DL (ref 11.7–15.7)
SPECIMEN EXP DATE BLD: NORMAL
T PALLIDUM IGG+IGM SER QL: NEGATIVE

## 2017-04-26 PROCEDURE — 27210794 ZZH OR GENERAL SUPPLY STERILE: Performed by: OBSTETRICS & GYNECOLOGY

## 2017-04-26 PROCEDURE — 37000008 ZZH ANESTHESIA TECHNICAL FEE, 1ST 30 MIN: Performed by: OBSTETRICS & GYNECOLOGY

## 2017-04-26 PROCEDURE — 36000058 ZZH SURGERY LEVEL 3 EA 15 ADDTL MIN: Performed by: OBSTETRICS & GYNECOLOGY

## 2017-04-26 PROCEDURE — 25800025 ZZH RX 258: Performed by: OBSTETRICS & GYNECOLOGY

## 2017-04-26 PROCEDURE — 37000009 ZZH ANESTHESIA TECHNICAL FEE, EACH ADDTL 15 MIN: Performed by: OBSTETRICS & GYNECOLOGY

## 2017-04-26 PROCEDURE — 86900 BLOOD TYPING SEROLOGIC ABO: CPT | Performed by: OBSTETRICS & GYNECOLOGY

## 2017-04-26 PROCEDURE — 71000012 ZZH RECOVERY PHASE 1 LEVEL 1 FIRST HR: Performed by: OBSTETRICS & GYNECOLOGY

## 2017-04-26 PROCEDURE — 86780 TREPONEMA PALLIDUM: CPT | Performed by: OBSTETRICS & GYNECOLOGY

## 2017-04-26 PROCEDURE — 25000132 ZZH RX MED GY IP 250 OP 250 PS 637: Performed by: OBSTETRICS & GYNECOLOGY

## 2017-04-26 PROCEDURE — 88302 TISSUE EXAM BY PATHOLOGIST: CPT | Performed by: OBSTETRICS & GYNECOLOGY

## 2017-04-26 PROCEDURE — 25000128 H RX IP 250 OP 636

## 2017-04-26 PROCEDURE — 59510 CESAREAN DELIVERY: CPT | Performed by: OBSTETRICS & GYNECOLOGY

## 2017-04-26 PROCEDURE — 85018 HEMOGLOBIN: CPT | Performed by: OBSTETRICS & GYNECOLOGY

## 2017-04-26 PROCEDURE — 58611 LIGATE OVIDUCT(S) ADD-ON: CPT | Mod: 80 | Performed by: OBSTETRICS & GYNECOLOGY

## 2017-04-26 PROCEDURE — 25000125 ZZHC RX 250

## 2017-04-26 PROCEDURE — 71000013 ZZH RECOVERY PHASE 1 LEVEL 1 EA ADDTL HR: Performed by: OBSTETRICS & GYNECOLOGY

## 2017-04-26 PROCEDURE — 36415 COLL VENOUS BLD VENIPUNCTURE: CPT | Performed by: OBSTETRICS & GYNECOLOGY

## 2017-04-26 PROCEDURE — 58611 LIGATE OVIDUCT(S) ADD-ON: CPT | Performed by: OBSTETRICS & GYNECOLOGY

## 2017-04-26 PROCEDURE — 25000128 H RX IP 250 OP 636: Performed by: OBSTETRICS & GYNECOLOGY

## 2017-04-26 PROCEDURE — 12000037 ZZH R&B POSTPARTUM INTERMEDIATE

## 2017-04-26 PROCEDURE — 86901 BLOOD TYPING SEROLOGIC RH(D): CPT | Performed by: OBSTETRICS & GYNECOLOGY

## 2017-04-26 PROCEDURE — 88302 TISSUE EXAM BY PATHOLOGIST: CPT | Mod: 26 | Performed by: OBSTETRICS & GYNECOLOGY

## 2017-04-26 PROCEDURE — 25000125 ZZHC RX 250: Performed by: OBSTETRICS & GYNECOLOGY

## 2017-04-26 PROCEDURE — 86850 RBC ANTIBODY SCREEN: CPT | Performed by: OBSTETRICS & GYNECOLOGY

## 2017-04-26 PROCEDURE — 36000056 ZZH SURGERY LEVEL 3 1ST 30 MIN: Performed by: OBSTETRICS & GYNECOLOGY

## 2017-04-26 PROCEDURE — 59514 CESAREAN DELIVERY ONLY: CPT | Mod: 80 | Performed by: OBSTETRICS & GYNECOLOGY

## 2017-04-26 PROCEDURE — 25000132 ZZH RX MED GY IP 250 OP 250 PS 637

## 2017-04-26 RX ORDER — ONDANSETRON 2 MG/ML
4 INJECTION INTRAMUSCULAR; INTRAVENOUS EVERY 6 HOURS PRN
Status: DISCONTINUED | OUTPATIENT
Start: 2017-04-26 | End: 2017-04-29 | Stop reason: HOSPADM

## 2017-04-26 RX ORDER — FENTANYL CITRATE 50 UG/ML
INJECTION, SOLUTION INTRAMUSCULAR; INTRAVENOUS
Status: DISCONTINUED
Start: 2017-04-26 | End: 2017-04-26 | Stop reason: HOSPADM

## 2017-04-26 RX ORDER — ONDANSETRON 2 MG/ML
4 INJECTION INTRAMUSCULAR; INTRAVENOUS EVERY 6 HOURS PRN
Status: DISCONTINUED | OUTPATIENT
Start: 2017-04-26 | End: 2017-04-28

## 2017-04-26 RX ORDER — ONDANSETRON 4 MG/1
4 TABLET, ORALLY DISINTEGRATING ORAL EVERY 6 HOURS PRN
Status: DISCONTINUED | OUTPATIENT
Start: 2017-04-26 | End: 2017-04-29 | Stop reason: HOSPADM

## 2017-04-26 RX ORDER — MISOPROSTOL 200 UG/1
400 TABLET ORAL
Status: DISCONTINUED | OUTPATIENT
Start: 2017-04-26 | End: 2017-04-29 | Stop reason: HOSPADM

## 2017-04-26 RX ORDER — NALOXONE HYDROCHLORIDE 0.4 MG/ML
.1-.4 INJECTION, SOLUTION INTRAMUSCULAR; INTRAVENOUS; SUBCUTANEOUS
Status: DISCONTINUED | OUTPATIENT
Start: 2017-04-26 | End: 2017-04-29 | Stop reason: HOSPADM

## 2017-04-26 RX ORDER — MORPHINE SULFATE 1 MG/ML
INJECTION, SOLUTION EPIDURAL; INTRATHECAL; INTRAVENOUS
Status: DISCONTINUED
Start: 2017-04-26 | End: 2017-04-26 | Stop reason: HOSPADM

## 2017-04-26 RX ORDER — MORPHINE SULFATE 1 MG/ML
INJECTION, SOLUTION EPIDURAL; INTRATHECAL; INTRAVENOUS PRN
Status: DISCONTINUED | OUTPATIENT
Start: 2017-04-26 | End: 2017-04-26

## 2017-04-26 RX ORDER — EPHEDRINE SULFATE 50 MG/ML
INJECTION, SOLUTION INTRAMUSCULAR; INTRAVENOUS; SUBCUTANEOUS PRN
Status: DISCONTINUED | OUTPATIENT
Start: 2017-04-26 | End: 2017-04-26

## 2017-04-26 RX ORDER — ACETAMINOPHEN 325 MG/1
975 TABLET ORAL ONCE
Status: COMPLETED | OUTPATIENT
Start: 2017-04-26 | End: 2017-04-26

## 2017-04-26 RX ORDER — ACETAMINOPHEN 325 MG/1
650 TABLET ORAL EVERY 4 HOURS PRN
Status: DISCONTINUED | OUTPATIENT
Start: 2017-04-29 | End: 2017-04-29 | Stop reason: HOSPADM

## 2017-04-26 RX ORDER — CEFAZOLIN SODIUM 2 G/100ML
2 INJECTION, SOLUTION INTRAVENOUS
Status: COMPLETED | OUTPATIENT
Start: 2017-04-26 | End: 2017-04-26

## 2017-04-26 RX ORDER — OXYTOCIN 10 [USP'U]/ML
10 INJECTION, SOLUTION INTRAMUSCULAR; INTRAVENOUS
Status: DISCONTINUED | OUTPATIENT
Start: 2017-04-26 | End: 2017-04-29 | Stop reason: HOSPADM

## 2017-04-26 RX ORDER — LIDOCAINE 40 MG/G
CREAM TOPICAL
Status: DISCONTINUED | OUTPATIENT
Start: 2017-04-26 | End: 2017-04-29 | Stop reason: HOSPADM

## 2017-04-26 RX ORDER — OXYTOCIN/0.9 % SODIUM CHLORIDE 30/500 ML
340 PLASTIC BAG, INJECTION (ML) INTRAVENOUS CONTINUOUS PRN
Status: DISCONTINUED | OUTPATIENT
Start: 2017-04-26 | End: 2017-04-29 | Stop reason: HOSPADM

## 2017-04-26 RX ORDER — EPHEDRINE SULFATE 50 MG/ML
5 INJECTION, SOLUTION INTRAMUSCULAR; INTRAVENOUS; SUBCUTANEOUS
Status: DISCONTINUED | OUTPATIENT
Start: 2017-04-26 | End: 2017-04-28

## 2017-04-26 RX ORDER — OXYCODONE HYDROCHLORIDE 5 MG/1
5-10 TABLET ORAL
Status: DISCONTINUED | OUTPATIENT
Start: 2017-04-26 | End: 2017-04-29 | Stop reason: HOSPADM

## 2017-04-26 RX ORDER — KETOROLAC TROMETHAMINE 30 MG/ML
INJECTION, SOLUTION INTRAMUSCULAR; INTRAVENOUS
Status: COMPLETED
Start: 2017-04-26 | End: 2017-04-26

## 2017-04-26 RX ORDER — SODIUM CHLORIDE, SODIUM LACTATE, POTASSIUM CHLORIDE, CALCIUM CHLORIDE 600; 310; 30; 20 MG/100ML; MG/100ML; MG/100ML; MG/100ML
INJECTION, SOLUTION INTRAVENOUS CONTINUOUS
Status: DISCONTINUED | OUTPATIENT
Start: 2017-04-26 | End: 2017-04-26

## 2017-04-26 RX ORDER — ACETAMINOPHEN 325 MG/1
TABLET ORAL
Status: COMPLETED
Start: 2017-04-26 | End: 2017-04-26

## 2017-04-26 RX ORDER — BUPIVACAINE HYDROCHLORIDE 7.5 MG/ML
INJECTION, SOLUTION INTRASPINAL PRN
Status: DISCONTINUED | OUTPATIENT
Start: 2017-04-26 | End: 2017-04-26

## 2017-04-26 RX ORDER — DIPHENHYDRAMINE HYDROCHLORIDE 50 MG/ML
25 INJECTION INTRAMUSCULAR; INTRAVENOUS EVERY 6 HOURS PRN
Status: DISCONTINUED | OUTPATIENT
Start: 2017-04-26 | End: 2017-04-29 | Stop reason: HOSPADM

## 2017-04-26 RX ORDER — KETOROLAC TROMETHAMINE 30 MG/ML
30 INJECTION, SOLUTION INTRAMUSCULAR; INTRAVENOUS EVERY 6 HOURS
Status: COMPLETED | OUTPATIENT
Start: 2017-04-26 | End: 2017-04-27

## 2017-04-26 RX ORDER — HYDROMORPHONE HYDROCHLORIDE 1 MG/ML
INJECTION, SOLUTION INTRAMUSCULAR; INTRAVENOUS; SUBCUTANEOUS
Status: COMPLETED
Start: 2017-04-26 | End: 2017-04-26

## 2017-04-26 RX ORDER — BISACODYL 10 MG
10 SUPPOSITORY, RECTAL RECTAL DAILY PRN
Status: DISCONTINUED | OUTPATIENT
Start: 2017-04-28 | End: 2017-04-29 | Stop reason: HOSPADM

## 2017-04-26 RX ORDER — CEFAZOLIN SODIUM 2 G/100ML
INJECTION, SOLUTION INTRAVENOUS
Status: DISCONTINUED
Start: 2017-04-26 | End: 2017-04-26 | Stop reason: HOSPADM

## 2017-04-26 RX ORDER — IBUPROFEN 400 MG/1
400-800 TABLET, FILM COATED ORAL EVERY 6 HOURS PRN
Status: DISCONTINUED | OUTPATIENT
Start: 2017-04-26 | End: 2017-04-29 | Stop reason: HOSPADM

## 2017-04-26 RX ORDER — SIMETHICONE 80 MG
80 TABLET,CHEWABLE ORAL 4 TIMES DAILY PRN
Status: DISCONTINUED | OUTPATIENT
Start: 2017-04-26 | End: 2017-04-29 | Stop reason: HOSPADM

## 2017-04-26 RX ORDER — OXYTOCIN/0.9 % SODIUM CHLORIDE 30/500 ML
100 PLASTIC BAG, INJECTION (ML) INTRAVENOUS CONTINUOUS
Status: DISCONTINUED | OUTPATIENT
Start: 2017-04-26 | End: 2017-04-28

## 2017-04-26 RX ORDER — OXYTOCIN/0.9 % SODIUM CHLORIDE 30/500 ML
PLASTIC BAG, INJECTION (ML) INTRAVENOUS CONTINUOUS PRN
Status: DISCONTINUED | OUTPATIENT
Start: 2017-04-26 | End: 2017-04-26

## 2017-04-26 RX ORDER — HYDROMORPHONE HYDROCHLORIDE 1 MG/ML
.3-.5 INJECTION, SOLUTION INTRAMUSCULAR; INTRAVENOUS; SUBCUTANEOUS EVERY 30 MIN PRN
Status: DISCONTINUED | OUTPATIENT
Start: 2017-04-26 | End: 2017-04-29 | Stop reason: HOSPADM

## 2017-04-26 RX ORDER — FENTANYL CITRATE 50 UG/ML
INJECTION, SOLUTION INTRAMUSCULAR; INTRAVENOUS PRN
Status: DISCONTINUED | OUTPATIENT
Start: 2017-04-26 | End: 2017-04-26

## 2017-04-26 RX ORDER — NALBUPHINE HYDROCHLORIDE 10 MG/ML
2.5-5 INJECTION, SOLUTION INTRAMUSCULAR; INTRAVENOUS; SUBCUTANEOUS EVERY 6 HOURS PRN
Status: DISCONTINUED | OUTPATIENT
Start: 2017-04-26 | End: 2017-04-28

## 2017-04-26 RX ORDER — DIPHENHYDRAMINE HCL 25 MG
25 CAPSULE ORAL EVERY 6 HOURS PRN
Status: DISCONTINUED | OUTPATIENT
Start: 2017-04-26 | End: 2017-04-29 | Stop reason: HOSPADM

## 2017-04-26 RX ORDER — LANOLIN 100 %
OINTMENT (GRAM) TOPICAL
Status: DISCONTINUED | OUTPATIENT
Start: 2017-04-26 | End: 2017-04-29 | Stop reason: HOSPADM

## 2017-04-26 RX ORDER — LIDOCAINE 40 MG/G
CREAM TOPICAL
Status: DISCONTINUED | OUTPATIENT
Start: 2017-04-26 | End: 2017-04-26

## 2017-04-26 RX ORDER — NALOXONE HYDROCHLORIDE 0.4 MG/ML
.1-.4 INJECTION, SOLUTION INTRAMUSCULAR; INTRAVENOUS; SUBCUTANEOUS
Status: DISCONTINUED | OUTPATIENT
Start: 2017-04-26 | End: 2017-04-28

## 2017-04-26 RX ORDER — LIDOCAINE 40 MG/G
CREAM TOPICAL
Status: DISCONTINUED | OUTPATIENT
Start: 2017-04-26 | End: 2017-04-28

## 2017-04-26 RX ORDER — ACETAMINOPHEN 325 MG/1
975 TABLET ORAL EVERY 8 HOURS
Status: DISPENSED | OUTPATIENT
Start: 2017-04-26 | End: 2017-04-29

## 2017-04-26 RX ORDER — ONDANSETRON 2 MG/ML
INJECTION INTRAMUSCULAR; INTRAVENOUS PRN
Status: DISCONTINUED | OUTPATIENT
Start: 2017-04-26 | End: 2017-04-26

## 2017-04-26 RX ORDER — CEFAZOLIN SODIUM 1 G/3ML
1 INJECTION, POWDER, FOR SOLUTION INTRAMUSCULAR; INTRAVENOUS
Status: DISCONTINUED | OUTPATIENT
Start: 2017-04-26 | End: 2017-04-26

## 2017-04-26 RX ORDER — DOCUSATE SODIUM 100 MG/1
100 CAPSULE, LIQUID FILLED ORAL 2 TIMES DAILY
Status: DISCONTINUED | OUTPATIENT
Start: 2017-04-26 | End: 2017-04-29 | Stop reason: HOSPADM

## 2017-04-26 RX ORDER — DEXTROSE, SODIUM CHLORIDE, SODIUM LACTATE, POTASSIUM CHLORIDE, AND CALCIUM CHLORIDE 5; .6; .31; .03; .02 G/100ML; G/100ML; G/100ML; G/100ML; G/100ML
INJECTION, SOLUTION INTRAVENOUS CONTINUOUS
Status: DISCONTINUED | OUTPATIENT
Start: 2017-04-26 | End: 2017-04-28

## 2017-04-26 RX ORDER — ONDANSETRON 2 MG/ML
INJECTION INTRAMUSCULAR; INTRAVENOUS
Status: DISCONTINUED
Start: 2017-04-26 | End: 2017-04-26 | Stop reason: HOSPADM

## 2017-04-26 RX ORDER — HYDROCORTISONE 2.5 %
CREAM (GRAM) TOPICAL 3 TIMES DAILY PRN
Status: DISCONTINUED | OUTPATIENT
Start: 2017-04-26 | End: 2017-04-29 | Stop reason: HOSPADM

## 2017-04-26 RX ADMIN — HYDROMORPHONE HYDROCHLORIDE 0.5 MG: 1 INJECTION, SOLUTION INTRAMUSCULAR; INTRAVENOUS; SUBCUTANEOUS at 09:04

## 2017-04-26 RX ADMIN — PHENYLEPHRINE HYDROCHLORIDE 50 MCG: 10 INJECTION, SOLUTION INTRAMUSCULAR; INTRAVENOUS; SUBCUTANEOUS at 07:37

## 2017-04-26 RX ADMIN — Medication 2.5 MG: at 07:14

## 2017-04-26 RX ADMIN — PHENYLEPHRINE HYDROCHLORIDE 100 MCG: 10 INJECTION, SOLUTION INTRAMUSCULAR; INTRAVENOUS; SUBCUTANEOUS at 07:10

## 2017-04-26 RX ADMIN — FENTANYL CITRATE 20 MCG: 50 INJECTION, SOLUTION INTRAMUSCULAR; INTRAVENOUS at 07:08

## 2017-04-26 RX ADMIN — PHENYLEPHRINE HYDROCHLORIDE 50 MCG: 10 INJECTION, SOLUTION INTRAMUSCULAR; INTRAVENOUS; SUBCUTANEOUS at 07:34

## 2017-04-26 RX ADMIN — ACETAMINOPHEN 975 MG: 325 TABLET, FILM COATED ORAL at 14:25

## 2017-04-26 RX ADMIN — KETOROLAC TROMETHAMINE 30 MG: 30 INJECTION, SOLUTION INTRAMUSCULAR at 08:51

## 2017-04-26 RX ADMIN — ONDANSETRON 4 MG: 2 SOLUTION INTRAMUSCULAR; INTRAVENOUS at 13:41

## 2017-04-26 RX ADMIN — KETOROLAC TROMETHAMINE 30 MG: 30 INJECTION, SOLUTION INTRAMUSCULAR at 20:00

## 2017-04-26 RX ADMIN — Medication 2.5 MG: at 07:18

## 2017-04-26 RX ADMIN — Medication 340 ML/HR: at 07:35

## 2017-04-26 RX ADMIN — PHENYLEPHRINE HYDROCHLORIDE 50 MCG: 10 INJECTION, SOLUTION INTRAMUSCULAR; INTRAVENOUS; SUBCUTANEOUS at 07:29

## 2017-04-26 RX ADMIN — Medication 100 ML/HR: at 11:59

## 2017-04-26 RX ADMIN — PHENYLEPHRINE HYDROCHLORIDE 50 MCG: 10 INJECTION, SOLUTION INTRAMUSCULAR; INTRAVENOUS; SUBCUTANEOUS at 07:26

## 2017-04-26 RX ADMIN — ACETAMINOPHEN 975 MG: 325 TABLET, FILM COATED ORAL at 22:05

## 2017-04-26 RX ADMIN — SODIUM CHLORIDE, POTASSIUM CHLORIDE, SODIUM LACTATE AND CALCIUM CHLORIDE 1000 ML: 600; 310; 30; 20 INJECTION, SOLUTION INTRAVENOUS at 05:37

## 2017-04-26 RX ADMIN — HYDROMORPHONE HYDROCHLORIDE 0.5 MG: 1 INJECTION, SOLUTION INTRAMUSCULAR; INTRAVENOUS; SUBCUTANEOUS at 12:13

## 2017-04-26 RX ADMIN — ONDANSETRON 4 MG: 2 INJECTION INTRAMUSCULAR; INTRAVENOUS at 07:37

## 2017-04-26 RX ADMIN — Medication 10 MG: at 07:10

## 2017-04-26 RX ADMIN — ACETAMINOPHEN 975 MG: 325 TABLET, FILM COATED ORAL at 06:32

## 2017-04-26 RX ADMIN — SODIUM CHLORIDE, POTASSIUM CHLORIDE, SODIUM LACTATE AND CALCIUM CHLORIDE: 600; 310; 30; 20 INJECTION, SOLUTION INTRAVENOUS at 07:21

## 2017-04-26 RX ADMIN — DOCUSATE SODIUM 100 MG: 100 CAPSULE, LIQUID FILLED ORAL at 20:00

## 2017-04-26 RX ADMIN — ACETAMINOPHEN 975 MG: 325 TABLET ORAL at 06:32

## 2017-04-26 RX ADMIN — PHENYLEPHRINE HYDROCHLORIDE 100 MCG: 10 INJECTION, SOLUTION INTRAMUSCULAR; INTRAVENOUS; SUBCUTANEOUS at 07:13

## 2017-04-26 RX ADMIN — PHENYLEPHRINE HYDROCHLORIDE 50 MCG: 10 INJECTION, SOLUTION INTRAMUSCULAR; INTRAVENOUS; SUBCUTANEOUS at 07:22

## 2017-04-26 RX ADMIN — BUPIVACAINE HYDROCHLORIDE IN DEXTROSE 12.5 MG: 7.5 INJECTION, SOLUTION SUBARACHNOID at 07:08

## 2017-04-26 RX ADMIN — CEFAZOLIN SODIUM 2 G: 2 INJECTION, SOLUTION INTRAVENOUS at 07:00

## 2017-04-26 RX ADMIN — HYDROMORPHONE HYDROCHLORIDE 0.5 MG: 1 INJECTION, SOLUTION INTRAMUSCULAR; INTRAVENOUS; SUBCUTANEOUS at 10:45

## 2017-04-26 RX ADMIN — PHENYLEPHRINE HYDROCHLORIDE 50 MCG: 10 INJECTION, SOLUTION INTRAMUSCULAR; INTRAVENOUS; SUBCUTANEOUS at 07:16

## 2017-04-26 RX ADMIN — KETOROLAC TROMETHAMINE 30 MG: 30 INJECTION, SOLUTION INTRAMUSCULAR at 14:25

## 2017-04-26 RX ADMIN — SODIUM CITRATE AND CITRIC ACID MONOHYDRATE 15 ML: 500; 334 SOLUTION ORAL at 06:33

## 2017-04-26 RX ADMIN — PHENYLEPHRINE HYDROCHLORIDE 50 MCG: 10 INJECTION, SOLUTION INTRAMUSCULAR; INTRAVENOUS; SUBCUTANEOUS at 07:41

## 2017-04-26 RX ADMIN — PHENYLEPHRINE HYDROCHLORIDE 50 MCG: 10 INJECTION, SOLUTION INTRAMUSCULAR; INTRAVENOUS; SUBCUTANEOUS at 07:19

## 2017-04-26 RX ADMIN — SODIUM CHLORIDE, POTASSIUM CHLORIDE, SODIUM LACTATE AND CALCIUM CHLORIDE: 600; 310; 30; 20 INJECTION, SOLUTION INTRAVENOUS at 06:49

## 2017-04-26 RX ADMIN — SODIUM CHLORIDE, SODIUM LACTATE, POTASSIUM CHLORIDE, CALCIUM CHLORIDE AND DEXTROSE MONOHYDRATE: 5; 600; 310; 30; 20 INJECTION, SOLUTION INTRAVENOUS at 16:54

## 2017-04-26 RX ADMIN — MORPHINE SULFATE 0.2 MG: 1 INJECTION EPIDURAL; INTRATHECAL; INTRAVENOUS at 07:08

## 2017-04-26 ASSESSMENT — COPD QUESTIONNAIRES: COPD: 0

## 2017-04-26 ASSESSMENT — LIFESTYLE VARIABLES: TOBACCO_USE: 0

## 2017-04-26 NOTE — IP AVS SNAPSHOT
MRN:[id number]                      After Visit Summary   2017    Yas Beck    MRN: [id number]           Thank you!     Thank you for choosing [facility] for your care. Our goal is always to provide you with excellent care. Hearing back from our patients is one way we can continue to improve our services. Please take a few minutes to complete the written survey that you may receive in the mail after you visit with us. Thank you!        Patient Information     Date Of Birth          1980        About your hospital stay     You were admitted on:  2017 You last received care in the:  [address]    You were discharged on:  2017       Who to Call     For medical emergencies, please call 911.  For non-urgent questions about your medical care, please call your primary care provider or clinic, [phone]  For questions related to your surgery, please call your surgery clinic        Attending Provider     Provider Kayla Weston MD OB/Gyn       Primary Care Provider Office Phone # Fax #    Lalo Lopez [phone]       [facility] [address] [place] [id number]        After Care Instructions     Activity       Review discharge instructions            Diet       Resume previous diet            Discharge Instructions - Postpartum visit       Schedule postpartum visit with your provider and return to clinic in 6 weeks.                  Your next 10 appointments already scheduled     2017  9:30 AM CDT   Post Partum with Kayla Campos MD   [facility] ([facility])    [address] [place] [address] [address]  [facility] [id number] [phone]              Further instructions from your care team       Postop  Birth Instructions    Activity       Do not lift more than 10 pounds for 6 weeks after surgery.  Ask family and  friends for help when you need it.    No driving until you have stopped taking your pain medications (usually two weeks after surgery).    No heavy exercise or activity for 6 weeks.  Don't do anything that will put a strain on your surgery site.    Don't strain when using the toilet.  Your care team may prescribe a stool softener if you have problems with your bowel movements.     To care for your incision:       Keep the incision clean and dry.    Do not soak your incision in water. No swimming or hot tubs until it has fully healed. You may soak in the bathtub if the water level is below your incision.    Do not use peroxide, gel, cream, lotion, or ointment on your incision.    Adjust your clothes to avoid pressure on your surgery site (check the elastic in your underwear for example).     You may see a small amount of clear or pink drainage and this is normal.  Check with your health care provider:       If the drainage increases or has an odor.    If the incision reddens, you have swelling, or develop a rash.    If you have increased pain and the medicine we prescribed doesn't help.    If you have a fever above 100.4 F (38 C) with or without chills when placing thermometer under your tongue.   The area around your incision (surgery wound), will feel numb.  This is normal. The numbness should go away in less than a year.     Keep your hands clean:  Always wash your hands before touching your incision (surgery wound). This helps reduce your risk of infection. If your hands aren't dirty, you may use an alcohol hand-rub to clean your hands. Keep your nails clean and short.    Call your healthcare provider if you have any of these symptoms:       You soak a sanitary pad with blood within 1 hour, or you see blood clots larger than a golf ball.    Bleeding that lasts more than 6 weeks.    Vaginal discharge that smells bad.    Severe pain, cramping or tenderness in your lower belly area.    A need to urinate more  "frequently (use the toilet more often), more urgently (use the toilet very quickly), or it burns when you urinate.    Nausea and vomiting.    Redness, swelling or pain around a vein in your leg.    Problems breastfeeding or a red or painful area on your breast.    Chest pain and cough or are gasping for air.    Problems with coping with sadness, anxiety or depression. If you have concerns about hurting yourself or the baby, call your provider immediately.      You have questions or concerns after you return home.                  Pending Results     No orders found from 2017 to 2017.            Statement of Approval     Ordered          17 0838  I have reviewed and agree with all the recommendations and orders detailed in this document.  EFFECTIVE NOW     Approved and electronically signed by:  Guanaco Hayes MD             Admission Information     Date & Time Provider Department Dept. Phone    2017 Kayla Campos MD [address] [phone]      Your Vitals Were     Blood Pressure Pulse Temperature Respirations Height Weight    113/79 53 97.7  F (36.5  C) (Oral) 16 1.702 m (5' 7\") 79.4 kg (175 lb)    Last Period Pulse Oximetry BMI (Body Mass Index)             2016 100% 27.41 kg/m2         [employer] Information     [employer] lets you send messages to your doctor, view your test results, renew your prescriptions, schedule appointments and more. To sign up, go to [email or web address] . Click on \"Log in\" on the left side of the screen, which will take you to the Welcome page. Then click on \"Sign up Now\" on the right side of the page.     You will be asked to enter the access code listed below, as well as some personal information. Please follow the directions to create your username and password.     Your access code is: [email or web address]  Expires: 2017 11:08 AM     Your access code will  in 90 days. If you need help or a new code, please call your " [facility] or [phone].        Care EveryWhere ID     This is your Care EveryWhere ID. This could be used by other organizations to access your [facility] medical records  [id number]           Review of your medicines      START taking        Dose / Directions    oxyCODONE 5 MG IR tablet   Commonly known as:  ROXICODONE        Dose:  5-10 mg   Take 1-2 tablets (5-10 mg) by mouth every 3 hours as needed for moderate to severe pain   Quantity:  30 tablet   Refills:  0         CONTINUE these medicines which have NOT CHANGED        Dose / Directions    MAGNESIUM OXIDE PO        Refills:  0       prenatal multivitamin  plus iron 27-0.8 MG Tabs per tablet   Indication:  Pregnancy        Dose:  1 tablet   Take 1 tablet by mouth daily.    Indications: Pregnancy   Refills:  0       vitamin B complex with vitamin C Tabs tablet        Dose:  1 tablet   Take 1 tablet by mouth daily   Refills:  0            Where to get your medicines      Some of these will need a paper prescription and others can be bought over the counter. Ask your nurse if you have questions.     Bring a paper prescription for each of these medications     oxyCODONE 5 MG IR tablet                Protect others around you: Learn how to safely use, store and throw away your medicines at www.disposemymeds.org.             Medication List: This is a list of all your medications and when to take them. Check marks below indicate your daily home schedule. Keep this list as a reference.      Medications           Morning Afternoon Evening Bedtime As Needed    MAGNESIUM OXIDE PO                                oxyCODONE 5 MG IR tablet   Commonly known as:  ROXICODONE   Take 1-2 tablets (5-10 mg) by mouth every 3 hours as needed for moderate to severe pain   Last time this was given:  5 mg on 4/29/2017  9:20 AM                                prenatal multivitamin  plus iron 27-0.8 MG Tabs per tablet   Take 1 tablet by mouth daily.    Indications: Pregnancy                                 vitamin B complex with vitamin C Tabs tablet   Take 1 tablet by mouth daily

## 2017-04-26 NOTE — ANESTHESIA POSTPROCEDURE EVALUATION
Patient: Yas AGUILAR Feslindy    Procedure(s):  REPEAT  SECTION, BILATERAL SALPINGECTOMY  - Wound Class: I-Clean    Diagnosis:prior , desires permanent sterilization  Diagnosis Additional Information: No value filed.    Anesthesia Type:  Spinal    Note:  Anesthesia Post Evaluation    Patient location during evaluation: PACU  Patient participation: Able to fully participate in evaluation  Level of consciousness: awake and alert  Pain management: adequate  Airway patency: patent  Cardiovascular status: acceptable  Respiratory status: acceptable  Hydration status: acceptable  PONV: none     Anesthetic complications: None          Last vitals:  Vitals:    17 1425 17 1540 17 1700   BP: 121/75 122/76    Pulse: 60 63    Resp: 16 16 16   Temp:  36.6  C (97.9  F)    SpO2: 100% 100% 100%         Electronically Signed By: Franklyn Elias MD  2017  6:01 PM

## 2017-04-26 NOTE — ANESTHESIA CARE TRANSFER NOTE
Patient: Yas Beck    Procedure(s):  REPEAT  SECTION, BILATERAL SALPINGECTOMY  - Wound Class: I-Clean    Diagnosis: prior , desires permanent sterilization  Diagnosis Additional Information: No value filed.    Anesthesia Type:   Spinal     Note:  Airway :Room Air  Patient transferred to:PACU  Comments: VSS. Airway and IV patent. Patient comfortable. Report to RN. Stable care transfer.      Vitals: (Last set prior to Anesthesia Care Transfer)    CRNA VITALS  2017 0743 - 2017 0824      2017             Resp Rate (set): 10                Electronically Signed By: LEDA Mata CRNA  2017  8:24 AM

## 2017-04-26 NOTE — L&D DELIVERY NOTE
Obstetrics Brief Operative Note    Pre-operative diagnosis: Repeat  section  Request for permanent sterilization   Post-operative diagnosis: Same   Procedure: Repeat low transverse  section  Bilateral salpingectomy   Surgeon: Kayla Campos MD   Assistant(s): Guanaco Hayes MD   Anesthesia: Spinal anesthesia   Estimated blood loss: 300ml   Complications: None   Findings: Live   Female  Cephalic presentation:  left occiput anterior  APGARS: 1 minute: 9   5 minute: 9  Weight: 8lbs 2oz  Placenta: normal  Tubes: normal  Uterus: normal  Ovaries: normal     Primary OB: Campos

## 2017-04-26 NOTE — OP NOTE
PREOPERATIVE DIAGNOSES:   1.  A 39+3 weeks' gestation.   2.  Prior  section x2.   3.  Patient desires permanent sterilization.      POSTOPERATIVE DIAGNOSES:     1.  A 39+3 weeks' gestation.   2.  Prior  section x2.   3.  Patient desires permanent sterilization.      PROCEDURES:     1.  Repeat low transverse  section.   2.  Bilateral salpingectomy.      SURGEON:  Kayla Campos MD      ASSISTANT:  MD Dr. Abigail Clarke's assistance was required for retraction and assistance due to 2 prior  sections.     ANESTHESIA:  Spinal.      ESTIMATED BLOOD LOSS:  300 cc.      COMPLICATIONS:  None.      FINDINGS:     1.  Normal-appearing uterus, bilateral tubes and ovaries.     2.  Live female infant in the left occiput anterior position with a weight of 8 pounds 2 ounces and Apgars of 9 and 9.      DESCRIPTION OF PROCEDURE:  The patient Yas Beck was taken to the operating room on 2017 where a spinal anesthesia was administered without difficulty.  She was then prepared and draped in the normal sterile fashion in the dorsal supine position with a left lateral tilt.        A Pfannenstiel skin incision was made through the pre-existing scar, and the incision was carried down to the underlying layer of fascia using the Bovie.  Fascia was then incised in the midline, and the incision was extended laterally using the Hernandez scissors.  The superior aspect of the fascial incision was then grasped with the Kocher clamps x2, elevated and the rectus muscles dissected.  Attention was similarly turned to the inferior border of the fascial incision.        The peritoneum was then identified, tented and entered bluntly.  This incision was extended superiorly and inferiorly with good visualization of the bladder.  The bladder blade was then placed, and the lower uterine segment was identified.  This was noted to be very thin, approximately 2 mm.  The vesicouterine peritoneum was  identified, tented and the bladder flap created.  Lower uterine segment was then incised in a transverse fashion with amniotomy and return of clear fluid.        The infant's head was delivered without difficulty.  A nuchal cord was reduced without issue.  The remainder of the infant was delivered, and the baby was placed on the maternal abdomen.  Cord clamping was delayed by 1 minute.  The infant was then handed off to the awaiting Baby Nurse.  The uterus was then exteriorized and cleared of all clot and debris.        The incision was re-approximated using an 0 Vicryl in a running locked fashion.  Two figure-of-eight sutures were placed in the midline and left side with excellent hemostasis achieved.  The incision was then covered.      Attention was turned to the bilateral salpingectomy.  Using the hand-held ligature the mesosalpinx was cauterized and cut from the cornua to the fimbriated end with excellent hemostasis noted.        The uterine incision was once again inspected and found to be hemostatic.  The uterus was replaced into the pelvic cavity, and gutters were cleared of all clot and debris.  The peritoneum was re-approximated using a 3-0 Vicryl in a running fashion.  The fascia was reapproximated using an 0 looped PDS in a running fashion.  Irrigation was performed prior to skin closure.  The skin was closed using a 4-0 Monocryl in a subcuticular fashion.  Both mother and infant were doing very well following delivery and were stable for recovery.         KAYLA STORM MD             D: 2017 08:20   T: 2017 11:25   MT: [id number]      Name:     CHEMA CONTRERAS   MRN:      [id number]        Account:        [id number]   :      1980           Procedure Date: 2017      Document: [id number]       cc: MD Kayla Falcon Jr, MD

## 2017-04-26 NOTE — PLAN OF CARE
Problem: Goal Outcome Summary  Goal: Goal Outcome Summary  Outcome: Improving  Pt arrived to floor, vitals stable, continuous pulse ox in place. Pt reports incision pain, given Dilaudid x2. On scheduled Toradol and tylenol. Pt walked to the bathroom with a steady gait and assist of 2 due to first time being up, tolerated activity well. . Lopes removed at 1330, per pt requeset. Pt reported nausea and had a small emesis. Given Zofran, pt has denied nausea since. Continue to monitor.

## 2017-04-26 NOTE — IP AVS SNAPSHOT
[address]    [place] [address]    Phone:  [phone]                                       After Visit Summary   4/26/2017    Yas Beck    MRN: [id number]           After Visit Summary Signature Page     I have received my discharge instructions, and my questions have been answered. I have discussed any challenges I see with this plan with the nurse or doctor.    ..........................................................................................................................................  Patient/Patient Representative Signature      ..........................................................................................................................................  Patient Representative Print Name and Relationship to Patient    ..................................................               ................................................  Date                                            Time    ..........................................................................................................................................  Reviewed by Signature/Title    ...................................................              ..............................................  Date                                                            Time

## 2017-04-26 NOTE — ANESTHESIA PREPROCEDURE EVALUATION
Anesthesia Evaluation     . Pt has had prior anesthetic.     No history of anesthetic complications          ROS/MED HX    ENT/Pulmonary:      (-) tobacco use, asthma, COPD and sleep apnea   Neurologic:     (+)migraines,     Cardiovascular:        (-) hypertension   METS/Exercise Tolerance:     Hematologic:         Musculoskeletal:         GI/Hepatic:        (-) GERD and liver disease   Renal/Genitourinary:      (-) renal disease   Endo:      (-) Type I DM and Type II DM   Psychiatric:         Infectious Disease:         Malignancy:         Other:                     Physical Exam  Normal systems: cardiovascular, pulmonary and dental    Airway   Mallampati: II  TM distance: >3 FB  Neck ROM: full    Dental     Cardiovascular       Pulmonary                     Anesthesia Plan      History & Physical Review  History and physical reviewed and following examination; no interval change.    ASA Status:  2 .    NPO Status:  > 8 hours    Plan for Spinal with Intravenous induction.   PONV prophylaxis:  Ondansetron (or other 5HT-3)       Postoperative Care  Postoperative pain management:  Neuraxial analgesia.      Consents  Anesthetic plan, risks, benefits and alternatives discussed with:  Patient..                          .

## 2017-04-26 NOTE — ANESTHESIA PROCEDURE NOTES
Peripheral nerve/Neuraxial procedure note : intrathecal  Pre-Procedure  Performed by YASMEEN RUBIN  Location: OR      Pre-Anesthestic Checklist: patient identified, IV checked, risks and benefits discussed, informed consent, monitors and equipment checked and pre-op evaluation    Timeout  Correct Patient: Yes   Correct Procedure: Yes   Correct Site: Yes   Correct Laterality: N/A   Correct Position: Yes   Site Marked: N/A   .   Procedure Documentation    .    Procedure:    Intrathecal.  Insertion Site:L3-4  (midline approach)      Patient Prep;mask, sterile gloves, povidone-iodine 7.5% surgical scrub, patient draped.  .  Needle: (). # of attempts: 1. # of redirects:. Spinal Needle: Abisai tip 25 G. 3.5 in.  Introducer used. . .     Assessment/Narrative  Paresthesias: No.  .  .  clear CSF fluid removed . Comments:  No pain with injection, questions answered about procedure.

## 2017-04-27 LAB
COPATH REPORT: NORMAL
HGB BLD-MCNC: 11.7 G/DL (ref 11.7–15.7)

## 2017-04-27 PROCEDURE — 25000132 ZZH RX MED GY IP 250 OP 250 PS 637: Performed by: OBSTETRICS & GYNECOLOGY

## 2017-04-27 PROCEDURE — 36415 COLL VENOUS BLD VENIPUNCTURE: CPT | Performed by: OBSTETRICS & GYNECOLOGY

## 2017-04-27 PROCEDURE — 12000037 ZZH R&B POSTPARTUM INTERMEDIATE

## 2017-04-27 PROCEDURE — 85018 HEMOGLOBIN: CPT | Performed by: OBSTETRICS & GYNECOLOGY

## 2017-04-27 PROCEDURE — 25000128 H RX IP 250 OP 636: Performed by: OBSTETRICS & GYNECOLOGY

## 2017-04-27 PROCEDURE — 0UT70ZZ RESECTION OF BILATERAL FALLOPIAN TUBES, OPEN APPROACH: ICD-10-PCS | Performed by: OBSTETRICS & GYNECOLOGY

## 2017-04-27 RX ADMIN — ACETAMINOPHEN 975 MG: 325 TABLET, FILM COATED ORAL at 22:25

## 2017-04-27 RX ADMIN — DOCUSATE SODIUM 100 MG: 100 CAPSULE, LIQUID FILLED ORAL at 08:20

## 2017-04-27 RX ADMIN — IBUPROFEN 800 MG: 400 TABLET ORAL at 13:30

## 2017-04-27 RX ADMIN — ACETAMINOPHEN 975 MG: 325 TABLET, FILM COATED ORAL at 06:40

## 2017-04-27 RX ADMIN — IBUPROFEN 800 MG: 400 TABLET ORAL at 19:08

## 2017-04-27 RX ADMIN — OXYCODONE HYDROCHLORIDE 5 MG: 5 TABLET ORAL at 20:45

## 2017-04-27 RX ADMIN — DOCUSATE SODIUM 100 MG: 100 CAPSULE, LIQUID FILLED ORAL at 22:25

## 2017-04-27 RX ADMIN — KETOROLAC TROMETHAMINE 30 MG: 30 INJECTION, SOLUTION INTRAMUSCULAR at 02:43

## 2017-04-27 RX ADMIN — ACETAMINOPHEN 975 MG: 325 TABLET, FILM COATED ORAL at 14:40

## 2017-04-27 RX ADMIN — IBUPROFEN 800 MG: 400 TABLET ORAL at 08:20

## 2017-04-27 RX ADMIN — OXYCODONE HYDROCHLORIDE 5 MG: 5 TABLET ORAL at 13:29

## 2017-04-27 NOTE — PLAN OF CARE
Problem: Goal Outcome Summary  Goal: Goal Outcome Summary  Outcome: No Change  Vital signs stable.  Fundus firm at U/1. Scant rubra flow.  Dressing to lower abdomen intact with small old drainage only.  Able to ambulate well in room.  Rating discomfort at a 2-3 on scale.  Patient unable to void after catheter removed on days at 1400.  Bladder scanned for 450 and straight cathed at 2040 for 850 ml. IV saline locked. Patient tolerating clear liquids and crackers.  Continue to monitor.

## 2017-04-27 NOTE — PROGRESS NOTES
[facility]    Obstetrics Post-Op / Progress Note    Assessment & Plan   Assessment:  -1 Day Post-Op  Procedure(s):  REPEAT  SECTION, BILATERAL SALPINGECTOMY  - Wound Class: I-Clean    Doing well.  No immediate surgical complications identified.  No excessive bleeding  Pain well-controlled.    Plan:  Ambulation encouraged  Hemoglobin in AM  Monitor wound for signs of infection  Pain control measures as needed  Reportable signs and symptoms dicussed with the patient    Kayla Campos     Interval History   Doing well.  Pain is well-controlled.  No fevers.  No history of wound drainage, warmth or significant erythema.  Good appetite.  Denies chest pain, shortness of breath, nausea or vomiting.  Ambulatory -some lightheadedness initially but now doing well.  Bottle feeding.  +flatus and hungry for breakfast    Medications     - MEDICATION INSTRUCTIONS -       - MEDICATION INSTRUCTIONS -       oxytocin in 0.9% NaCl 100 mL/hr (17 1505)     dextrose 5% lactated ringers 125 mL/hr at 17 1654     oxytocin in 0.9% NaCl       NO Rho (D) immune globulin (RhoGam) needed - mother Rh POSITIVE       - MEDICATION INSTRUCTIONS -       - MEDICATION INSTRUCTIONS -         sodium chloride (PF)  3 mL Intracatheter Q8H     sodium chloride (PF)  3 mL Intracatheter Q8H     acetaminophen  975 mg Oral Q8H     docusate sodium  100 mg Oral BID       Physical Exam   Temp: 97.5  F (36.4  C) Temp src: Oral BP: 98/58 Pulse: 63 Heart Rate: 50 Resp: 16 SpO2: 100 %      Vitals:    17 0511   Weight: 79.4 kg (175 lb)     Vital Signs with Ranges  Temp:  [97.3  F (36.3  C)-98  F (36.7  C)] 97.5  F (36.4  C)  Pulse:  [59-91] 63  Heart Rate:  [50-68] 50  Resp:  [16] 16  BP: ()/(46-76) 98/58  SpO2:  [96 %-100 %] 100 %  I/O last 3 completed shifts:  In: 2845 [P.O.:600; I.V.:2245]  Out: 1250 [Urine:1250]    Uterine fundus is firm, non-tender and at the level of the umbilicus  Incision C/D/I  Extremities  Non-tender    Data   Recent Labs   Lab Test  04/26/17   0515   ABO  B   RH   Pos   AS  Neg     Recent Labs   Lab Test  04/26/17   0515  01/26/17   0829   HGB  13.5  11.8     Recent Labs   Lab Test  09/30/16   1109   RUQIGG  14

## 2017-04-27 NOTE — PLAN OF CARE
Problem: Goal Outcome Summary  Goal: Goal Outcome Summary  Outcome: Improving  Patient states incisonal pain controlled well with duramorph, tylenol and toradol. Tolerating general diet. Initial assessment patient was deviated to the left and having difficulty voiding. Throughout the night patient able to void several times and emptying bladder. Uterus now midline and down 1. Up ambulating in her room. Will continue to assist with cares as needed.

## 2017-04-27 NOTE — PLAN OF CARE
Problem: Goal Outcome Summary  Goal: Goal Outcome Summary  Outcome: Improving  VSS. Pt ambulating and independent with cares. Pt took warm shower, dressing was removed. Pt given abdomen binder for comfort. Voiding w/o difficulty. Bonding well with baby. Continue with plan of care.

## 2017-04-28 PROCEDURE — 12000037 ZZH R&B POSTPARTUM INTERMEDIATE

## 2017-04-28 PROCEDURE — 25000132 ZZH RX MED GY IP 250 OP 250 PS 637: Performed by: OBSTETRICS & GYNECOLOGY

## 2017-04-28 RX ADMIN — IBUPROFEN 800 MG: 400 TABLET ORAL at 14:16

## 2017-04-28 RX ADMIN — OXYCODONE HYDROCHLORIDE 5 MG: 5 TABLET ORAL at 16:55

## 2017-04-28 RX ADMIN — OXYCODONE HYDROCHLORIDE 5 MG: 5 TABLET ORAL at 08:07

## 2017-04-28 RX ADMIN — OXYCODONE HYDROCHLORIDE 5 MG: 5 TABLET ORAL at 20:16

## 2017-04-28 RX ADMIN — IBUPROFEN 800 MG: 400 TABLET ORAL at 20:16

## 2017-04-28 RX ADMIN — DOCUSATE SODIUM 100 MG: 100 CAPSULE, LIQUID FILLED ORAL at 08:04

## 2017-04-28 RX ADMIN — IBUPROFEN 800 MG: 400 TABLET ORAL at 08:03

## 2017-04-28 RX ADMIN — DOCUSATE SODIUM 100 MG: 100 CAPSULE, LIQUID FILLED ORAL at 16:56

## 2017-04-28 RX ADMIN — OXYCODONE HYDROCHLORIDE 5 MG: 5 TABLET ORAL at 22:40

## 2017-04-28 RX ADMIN — OXYCODONE HYDROCHLORIDE 5 MG: 5 TABLET ORAL at 01:12

## 2017-04-28 RX ADMIN — ACETAMINOPHEN 975 MG: 325 TABLET, FILM COATED ORAL at 16:55

## 2017-04-28 RX ADMIN — IBUPROFEN 800 MG: 400 TABLET ORAL at 01:12

## 2017-04-28 RX ADMIN — OXYCODONE HYDROCHLORIDE 5 MG: 5 TABLET ORAL at 14:16

## 2017-04-28 RX ADMIN — ACETAMINOPHEN 975 MG: 325 TABLET, FILM COATED ORAL at 08:04

## 2017-04-28 NOTE — PLAN OF CARE
Problem: Goal Outcome Summary  Goal: Goal Outcome Summary  Outcome: Improving  Patient doing well.  Pain well controlled with ibuprofen and tylenol this evening.  Rating discomfort around a 2-3.  Up in room with steady gait.  Tolerating regular diet well.  Voiding with out difficulty.  Bottle feeding baby girl every 3 hours.

## 2017-04-28 NOTE — PLAN OF CARE
Problem: Goal Outcome Summary  Goal: Goal Outcome Summary  Outcome: No Change  Data: Vital signs within normal limits. Postpartum checks within normal limits - see flow record. Patient eating and drinking normally. Patient able to empty bladder independently and is up ambulating. No apparent signs of infection. Incision healing well. Patient performing self cares and is able to care for infant.  Action: Patient medicated during the shift for pain. See MAR. Patient reassessed within 1 hour after each medication and pain was improved - patient stated she was comfortable. Patient education done. See flow record.  Response: Positive attachment behaviors observed with infant. Support persons is not present.   Plan: Anticipate discharge on 4/28.

## 2017-04-28 NOTE — PLAN OF CARE
Problem: Goal Outcome Summary  Goal: Goal Outcome Summary  Outcome: No Change  VSS. Pain well controled with ibuprofen, tylenol and oxy prn. Bonding well with baby. Plan to discharge tomorrow.

## 2017-04-28 NOTE — PROGRESS NOTES
[facility]    Obstetrics Post-Op / Progress Note    Assessment & Plan   Assessment:  -2 Days Post-Op  Procedure(s):  REPEAT  SECTION, BILATERAL SALPINGECTOMY  - Wound Class: I-Clean    Doing well.  Clean wound without signs of infection.  No immediate surgical complications identified.  No excessive bleeding  Pain well-controlled.    Plan:  Ambulation encouraged  Pain control measures as needed  Reportable signs and symptoms dicussed with the patient  Anticipate discharge tomorrow    Kayla Campos     Interval History   Doing well.  Pain is well-controlled.  No fevers.  No history of wound drainage, warmth or significant erythema.  Good appetite.  Denies chest pain, shortness of breath, nausea or vomiting.  Ambulatory.  Bottle feeding. Doing well --had a good day yesterday.  Bleeding minimal.     Medications     - MEDICATION INSTRUCTIONS -       - MEDICATION INSTRUCTIONS -       oxytocin in 0.9% NaCl 100 mL/hr (17 1505)     dextrose 5% lactated ringers 125 mL/hr at 17 1654     oxytocin in 0.9% NaCl       NO Rho (D) immune globulin (RhoGam) needed - mother Rh POSITIVE       - MEDICATION INSTRUCTIONS -       - MEDICATION INSTRUCTIONS -         sodium chloride (PF)  3 mL Intracatheter Q8H     acetaminophen  975 mg Oral Q8H     docusate sodium  100 mg Oral BID       Physical Exam   Temp: 97.9  F (36.6  C) Temp src: Oral BP: 104/62 Pulse: 59 Heart Rate: 62 Resp: 16        Vitals:    17 0511   Weight: 79.4 kg (175 lb)     Vital Signs with Ranges  Temp:  [97.3  F (36.3  C)-97.9  F (36.6  C)] 97.9  F (36.6  C)  Pulse:  [59] 59  Heart Rate:  [55-62] 62  Resp:  [16] 16  BP: (104-121)/(62-75) 104/62       Uterine fundus is firm, non-tender and at the level of the umbilicus  Incision C/D/I  Extremities Non-tender; trace edema    Data   Recent Labs   Lab Test  17   0515   ABO  B   RH   Pos   AS  Neg     Recent Labs   Lab Test  17   1027  17   0515   HGB  11.7  13.5      Recent Labs   Lab Test  09/30/16   1109   RUQIGG  14

## 2017-04-29 VITALS
DIASTOLIC BLOOD PRESSURE: 79 MMHG | OXYGEN SATURATION: 100 % | HEART RATE: 53 BPM | WEIGHT: 175 LBS | RESPIRATION RATE: 16 BRPM | TEMPERATURE: 97.7 F | BODY MASS INDEX: 27.47 KG/M2 | SYSTOLIC BLOOD PRESSURE: 113 MMHG | HEIGHT: 67 IN

## 2017-04-29 PROCEDURE — 25000132 ZZH RX MED GY IP 250 OP 250 PS 637: Performed by: OBSTETRICS & GYNECOLOGY

## 2017-04-29 RX ORDER — OXYCODONE HYDROCHLORIDE 5 MG/1
5-10 TABLET ORAL
Qty: 30 TABLET | Refills: 0 | Status: SHIPPED | OUTPATIENT
Start: 2017-04-29 | End: 2017-06-01

## 2017-04-29 RX ADMIN — ACETAMINOPHEN 650 MG: 325 TABLET, FILM COATED ORAL at 12:22

## 2017-04-29 RX ADMIN — IBUPROFEN 800 MG: 400 TABLET ORAL at 09:20

## 2017-04-29 RX ADMIN — IBUPROFEN 800 MG: 400 TABLET ORAL at 02:15

## 2017-04-29 RX ADMIN — ACETAMINOPHEN 975 MG: 325 TABLET, FILM COATED ORAL at 02:15

## 2017-04-29 RX ADMIN — OXYCODONE HYDROCHLORIDE 5 MG: 5 TABLET ORAL at 09:20

## 2017-04-29 RX ADMIN — DOCUSATE SODIUM 100 MG: 100 CAPSULE, LIQUID FILLED ORAL at 09:20

## 2017-04-29 RX ADMIN — OXYCODONE HYDROCHLORIDE 5 MG: 5 TABLET ORAL at 12:22

## 2017-04-29 NOTE — PLAN OF CARE
Problem: Goal Outcome Summary  Goal: Goal Outcome Summary  Outcome: Adequate for Discharge Date Met:  04/29/17  Doing well,pain control with tylenol,ibuprofen&oxycodone,abdominal incision dry&intact with steri strips,voiding with out difficulty,independent with self&baby cares,baby bottle feeding well,discharge today.

## 2017-04-29 NOTE — PLAN OF CARE
D: VSS, assessments WDL.   I: Pt. received complete discharge paperwork and home medications as filled by discharge pharmacy.  Pt. was given times of last dose for all discharge medications in writing on discharge medication sheets.  Discharge teaching included home medication, pain management, activity restrictions, postpartum cares, and signs and symptoms of infection.    A: Discharge outcomes on care plan met.  Mother states understanding and comfort with self and baby cares.  P: Pt. discharged to home.  Pt. was discharged with baby, and bands were checked at time of discharge.  Pt. was accompanied by , nurse and baby, and left with personal belongings.  Home care ordered.  Pt. to follow up with OB per MD order.  Pt. had no further questions at the time of discharge and no unmet needs were identified.

## 2017-04-29 NOTE — PLAN OF CARE
Problem: Goal Outcome Summary  Goal: Goal Outcome Summary  Outcome: No Change  Data: Vital signs within normal limits. Postpartum checks within normal limits - see flow record. Patient eating and drinking normally. Patient able to empty bladder independently and is up ambulating. No apparent signs of infection. Incision healing well. Patient performing self cares and is able to care for infant.  Action: Patient medicated during the shift for pain. See MAR. Patient reassessed within 1 hour after each medication and pain was improved - patient stated she was comfortable. Patient education done. See flow record.  Response: Positive attachment behaviors observed with infant. Support persons is not present.   Plan: Anticipate discharge on 4/29.

## 2017-04-29 NOTE — PLAN OF CARE
Problem: Goal Outcome Summary  Goal: Goal Outcome Summary  Outcome: Improving  VSS. Fundus firm, scant flow. Incision C/D/I with steri strips.  Up independent, tolerates well.  Pain well controlled, requesting prn pain meds as needed.  Independent with bottle feeding  and  cares.  On pathway. Continue to monitor and notify MD as needed.

## 2017-04-30 ENCOUNTER — DOCUMENTATION ONLY (OUTPATIENT)
Dept: CARE COORDINATION | Facility: CLINIC | Age: 37
End: 2017-04-30

## 2017-04-30 NOTE — PROGRESS NOTES
[facility] now requests orders and shares plan of care/discharge summaries for some patients through [employer]. Thank you for your assistance in improving collaboration for our patients.    Home visit for postpartum/ assessment and education offered to patient.  Patient declined visit due to feels she has good support and she and baby are doing well.  Advised to follow up with Primary Care Providers for mom and baby.    Thank you for the referral.  Sincerely, [facility] [phone]

## 2017-05-03 NOTE — DISCHARGE SUMMARY
DATE OF ADMISSION:  2017        DATE OF DISCHARGE:  2017       HOSPITAL COURSE:  Chema cuevas is a 36-year-old  who was brought to Labor and Delivery at 39+3 weeks' gestation for scheduled repeat  section as well as bilateral salpingectomy for permanent sterilization.  Rachel's surgery was overall uncomplicated with delivery of a healthy female infant with a weight of 8 pounds 2 ounces and Apgars of 9 and 9.  Rachel's postoperative hospital course was overall uncomplicated.  Pain was well controlled using a combination of IV as well as oral medications.  Lopes catheter was discontinued on postoperative day 0 and she was able to void without difficulty.  Rachel was allowed a regular diet with return of bowel function and was tolerating regular diet without difficulty prior to discharge home.  Rachel was remained hemodynamically stable throughout postoperative hospital course with postoperative hemoglobin of 11.7 from 13.5 preoperatively.  Rachel showed no signs or symptoms of postoperative infection.  She was discharged to home on postoperative day #3 after meeting all requirements for discharge.  She verbalized understanding of the discharge instructions and will follow up with me for routine postpartum visit at 6 weeks postpartum.         GM STORM MD             D: 2017 18:22   T: 2017 08:22   MT: felix      Name:     CHEMA CONTRERAS   MRN:      [id number]        Account:        [id number]   :      1980           Admit Date:     [id number]                                  Discharge Date: 2017      Document: [id number]

## 2017-05-09 ENCOUNTER — TELEPHONE (OUTPATIENT)
Dept: NURSING | Facility: CLINIC | Age: 37
End: 2017-05-09

## 2017-05-10 NOTE — TELEPHONE ENCOUNTER
Call Type: Triage Call    Presenting Problem: 'I had a C section on 4/26(see epic), when I left  the hosptial I wasn't really bleeding. But last night and all day  today I have been having vaginal bleeding and lower pelvic  cramping(darker blood). I never had this with my other two. Denies  fever, unusual discharge, clots or other sx. Per pt. Incision looks  good. She is requesting MD(OB) be paged.Paged on call   (WE group) through page op to call patient at [phone], at  7:08pm.  Triage Note:  Guideline Title: Postoperative Problems  Recommended Disposition: Call Provider Immediately  Original Inclination: Wanted to speak with a nurse  Override Disposition:  Intended Action: Follow advice given  Physician Contacted: No  More bleeding from site of instrumentation or procedure OR bleeding lasting longer  than defined in provider specified discharge information ?  YES  Signs of dehydration ? NO  Unconscious now ? NO  Abdominal bloating ? NO  New onset OR worsening bleeding from incision requiring pressure to control ? NO  Depression and no other symptoms ? NO  Severe breathing problems ? NO  Wound separation AND internal organs protrude through wound or surgical incision  (evisceration) ? NO  Hives /Urticaria /Rash ? NO  New or worsening signs and symptoms that may indicate shock ? NO  Neck lump/swelling ? NO  Coughing up large amount of obvious blood (not blood-streaked sputum) ? NO  Orthopedic hardware (metal plate, yanet or screw) newly bulging under or through  skin ? NO  New seizure now or within last 6 hours ? NO  Continuous or heavy bleeding from operative site and NOT controlled with 10  minutes of steady pressure ? NO  Pain not relieved by pain medication when taken as directed ? NO  Passing red, black or tarry material from rectum AND onset of new signs and  symptoms of hypovolemia ? NO  Wearing cast or splint AND new or worsening pain, swelling, numbness, tingling,  coolness or change in color  that is NOT improved by elevation for 30 minutes OR  not resolved within 2 hours ? NO  Temperature of 101.5 F (38.6 C) or greater that has not responded to 24 hours of  home care measures ? NO  Vomiting red, bloody or coffee-ground material, more than streaks of blood or  scant amount (not following nosebleed within past day) ? NO  New onset severe pain and pale, discolored or cool below the surgical site  compared to the other extremity ? NO  Current or recent urinary tract instrumentation AND urinary tract symptoms OR no  urine flow ? NO  New or worsening breathing problems ? NO  Heavy vaginal bleeding (soaking 1 pad/tampon every hour for 2 hours or more) ? NO  Urinary tract symptoms AND any flank or low back pain ? NO  Chest discomfort associated with shortness of breath, sweating, odd heartbeats or  different heart rate, nausea, vomiting, lightheadedness, or fainting lasting 5 or  more minutes now or within the last hour ? NO  Chest pain spreading to the shoulders, neck, jaw, in one or both arms, stomach or  back lasting 5 or more minutes now or within the last hour. Pain is NOT  associated with taking a deep breath or a productive cough, movement, or touch to  a localized area. ? NO  Signs and symptoms of anaphylaxis ? NO  Questions or concerns about postoperative wound ? NO  Headache following spinal anesthesia AND not relieved by pain medication ? NO  Pressure, fullness, squeezing sensation or pain anywhere in the chest lasting 5 or  more minutes now or within the last hour. Pain is NOT associated with taking a  deep breath or a productive cough, movement, or touch to a localized area on the  chest. ? NO  Sudden onset of focal neurological changes (difficulty speaking, numbness,  weakness, paralysis, loss of coordination, or change in vision such as double  vision or loss of visual field) ? NO  Sudden onset of shortness of breath, chest pain and cough with blood tinged sputum  ? NO  No urination for 12 or  more hours ? NO  Abdominal pain, any blood in vomitus or stool, abdominal bloating, new fever or  other cautionary symptoms began after GI surgery or procedure and is lasting  longer than defined in provider specified discharge information. ? NO  Abdominal pain, any blood in vomitus or stool, abdominal bloating, new fever or  other cautionary symptoms began after GI surgery or procedure and is lasting  longer than defined in provider specified discharge information. ? NO  New onset of unbearable pain within last 24 hours ? NO  Any temperature elevation in an immunocompromised individual OR frail elderly with  signs of dehydration ? NO  Unable to retain food or fluids for 24 hours or more due to recurrent vomiting ? NO  Any other unexpected urinary symptoms following urinary tract or abdominal surgery  within timeframe specified by provider ? NO  Physician Instructions:  Care Advice: SYMPTOM / CONDITION MANAGEMENT

## 2017-06-01 ENCOUNTER — PRENATAL OFFICE VISIT (OUTPATIENT)
Dept: OBGYN | Facility: CLINIC | Age: 37
End: 2017-06-01
Payer: COMMERCIAL

## 2017-06-01 VITALS
DIASTOLIC BLOOD PRESSURE: 70 MMHG | SYSTOLIC BLOOD PRESSURE: 112 MMHG | HEIGHT: 67 IN | WEIGHT: 160 LBS | BODY MASS INDEX: 25.11 KG/M2

## 2017-06-01 DIAGNOSIS — Z98.891 STATUS POST CESAREAN DELIVERY: ICD-10-CM

## 2017-06-01 DIAGNOSIS — G43.909 MIGRAINE WITHOUT STATUS MIGRAINOSUS, NOT INTRACTABLE, UNSPECIFIED MIGRAINE TYPE: ICD-10-CM

## 2017-06-01 PROCEDURE — 99207 ZZC POST PARTUM EXAM: CPT | Performed by: OBSTETRICS & GYNECOLOGY

## 2017-06-01 RX ORDER — SUMATRIPTAN 100 MG/1
100 TABLET, FILM COATED ORAL
Qty: 8 TABLET | Refills: 1 | Status: SHIPPED | OUTPATIENT
Start: 2017-06-01 | End: 2022-04-14

## 2017-06-01 ASSESSMENT — ANXIETY QUESTIONNAIRES
1. FEELING NERVOUS, ANXIOUS, OR ON EDGE: NOT AT ALL
7. FEELING AFRAID AS IF SOMETHING AWFUL MIGHT HAPPEN: NOT AT ALL
5. BEING SO RESTLESS THAT IT IS HARD TO SIT STILL: NOT AT ALL
3. WORRYING TOO MUCH ABOUT DIFFERENT THINGS: NOT AT ALL
6. BECOMING EASILY ANNOYED OR IRRITABLE: NOT AT ALL
GAD7 TOTAL SCORE: 0
IF YOU CHECKED OFF ANY PROBLEMS ON THIS QUESTIONNAIRE, HOW DIFFICULT HAVE THESE PROBLEMS MADE IT FOR YOU TO DO YOUR WORK, TAKE CARE OF THINGS AT HOME, OR GET ALONG WITH OTHER PEOPLE: NOT DIFFICULT AT ALL
2. NOT BEING ABLE TO STOP OR CONTROL WORRYING: NOT AT ALL

## 2017-06-01 ASSESSMENT — PATIENT HEALTH QUESTIONNAIRE - PHQ9: 5. POOR APPETITE OR OVEREATING: NOT AT ALL

## 2017-06-01 NOTE — PROGRESS NOTES
"  SUBJECTIVE:  Yas Beck,  is here for a postpartum visit.  She had a  Section  on 17 delivering a healthy baby girl, named Carla weighing 8 lbs 2 oz at term, 39w3d.    HPI:  Here today for postpartum exam --doing well.  Baby, Carla, has been very good baby --a bit more relaxed than her others.  Good eater and sleeper.  Bottle feeding only.    Rachel is doing well --eating/drinking well.  No bowel/bladder issues.  Still having some discomfort at incision --primarily on her left side.  Worse at the end of the day.  Difficult to describe --no sharp shooting pains but more than cramping.  Using tylenol/ibuprofen on occ with relief.  No incision concerns  No mood issues --denies depression/anxiety  Had salpingectomy at time of c/s for contraception    Last PHQ-9 score on record=   PHQ-9 SCORE 2017   Total Score 0     SANTIAGO-7 SCORE 2016   Total Score 1 0       Delivery complications:  No  Breast feeding:  No  Bladder problems:  No  Bowel problems/hemorrhoids:  No  Episiotomy/laceration/incision healed? 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    OBJECTIVE:  Vitals: /70  Ht 5' 7\" (1.702 m)  Wt 160 lb (72.6 kg)  LMP 2016  BMI 25.06 kg/m2  BMI= Body mass index is 25.06 kg/(m^2).  General - pleasant female in no acute distress.  Breast - deferred  Abdomen - soft, nontender, nondistended, no hepatosplenomegaly; INCISION WELL HEALED  Pelvic - EG: normal adult female, BUS: within normal limits, Vagina: well rugated, no discharge, Cervix: no lesions or CMT, Uterus: firm, normal sized and nontender, anteverted in position. Adnexae: no masses or tenderness.  Rectovaginal - " deferred.    ASSESSMENT:    ICD-10-CM    1. Routine postpartum follow-up Z39.2    2. Migraine without status migrainosus, not intractable, unspecified migraine type G43.909 SUMAtriptan (IMITREX) 100 MG tablet   3. Status post  delivery Z98.891        PLAN:  May resume normal activities without restrictions.  Pap smear was not done today, last pap was 13  Negative, Neg-HPV    Full counseling was provided, and all questions were answered.   Return to clinic in one year for an annual visit.     Patient Instructions   Follow up with your primary care provider for your other medical problems.  Continue self breast exam.  Increase physical activity and exercise.  Usual safety and preventative measures counseling done.  Weight loss encouraged.  Last pap smear () was normal and negative for the DNA of high risk HPV subtypes.  No pap was obtained this year.  This was discussed with the patient and she agrees with the plan.  Will continue to monitor left incision pain over the next few weeks --if were to worsen or to persist, will consider imaging of this area.      Kayla Campos MD

## 2017-06-01 NOTE — PROGRESS NOTES
"    SUBJECTIVE:                                                   Yas Beck is a 36 year old female who presents to clinic today for the following health issue(s):  Patient presents with:  Post Partum Exam      Additional information: ***    HPI:  ***    Patient's last menstrual period was 2016..   Patient {is/is not:344369::\"is\"} sexually active, .  Using {PLC CONTRACEPTION:548541} for contraception.    reports that she has never smoked. She has never used smokeless tobacco.  {Tobacco Cessation -- Delete if patient is a non-smoker:829756}  STD testing offered?  {PLC GC/CHLAMYDIA:924975}    Health maintenance updated:  {yes no:749959}    Today's PHQ-2 Score: No flowsheet data found.  Today's PHQ-9 Score:   PHQ-9 SCORE 2016   Total Score 1     Today's SANTIAGO-7 Score:   SANTIAGO-7 SCORE 2016   Total Score 1       Problem list and histories reviewed & adjusted, as indicated.  Additional history: as documented.    Patient Active Problem List   Diagnosis     HSV-1 infection     Migraine     Indication for care in labor or delivery     Status post  delivery     Past Surgical History:   Procedure Laterality Date      SECTION        SECTION  2012    Procedure:  SECTION;  REPEAT  SECTION;  Surgeon: Lalo Lopez MD;  Location: [facility]     COMBINED  SECTION, SALPINGECTOMY BILATERAL N/A 2017    Procedure: COMBINED  SECTION, SALPINGECTOMY BILATERAL;  REPEAT  SECTION, BILATERAL SALPINGECTOMY ;  Surgeon: Kayla Campos MD;  Location: [facility]      Social History   Substance Use Topics     Smoking status: Never Smoker     Smokeless tobacco: Never Used     Alcohol use No      Problem (# of Occurrences) Relation (Name,Age of Onset)    CANCER (1) Father: Father had multiple mylema and passed away 2016.    Hyperlipidemia (2) Sister, Mother            Current Outpatient Prescriptions   Medication Sig     oxyCODONE " "(ROXICODONE) 5 MG IR tablet Take 1-2 tablets (5-10 mg) by mouth every 3 hours as needed for moderate to severe pain     vitamin  B complex with vitamin C (VITAMIN  B COMPLEX) TABS Take 1 tablet by mouth daily     MAGNESIUM OXIDE PO      Prenatal Vit-Fe Fumarate-FA (PRENATAL MULTIVITAMIN  PLUS IRON) 27-0.8 MG TABS Take 1 tablet by mouth daily.    Indications: Pregnancy     No current facility-administered medications for this visit.      No Known Allergies    ROS:  {[facility] ROSGYN:014790::\"12 point review of systems negative other than symptoms noted below.\"}    OBJECTIVE:     LMP 07/24/2016  There is no height or weight on file to calculate BMI.    Exam:  {[facility] EXAM CHOICES:281692}     In-Clinic Test Results:  No results found for this or any previous visit (from the past 24 hour(s)).    ASSESSMENT/PLAN:                                                      No diagnosis found.    There are no Patient Instructions on file for this visit.    ***    Kayla Campos MD  [facility]  "

## 2017-06-01 NOTE — MR AVS SNAPSHOT
After Visit Summary   2017    Yas Beck    MRN: [id number]           Patient Information     Date Of Birth          1980        Visit Information        Provider Department      2017 9:30 AM Kayla Campos MD [facility]        Today's Diagnoses     Routine postpartum follow-up    -  1    Migraine without status migrainosus, not intractable, unspecified migraine type        Status post  delivery          Care Instructions    Follow up with your primary care provider for your other medical problems.  Continue self breast exam.  Increase physical activity and exercise.  Usual safety and preventative measures counseling done.  Weight loss encouraged.  Last pap smear () was normal and negative for the DNA of high risk HPV subtypes.  No pap was obtained this year.  This was discussed with the patient and she agrees with the plan.  Will continue to monitor left incision pain over the next few weeks --if were to worsen or to persist, will consider imaging of this area.            Follow-ups after your visit        Follow-up notes from your care team     Return in about 1 year (around 2018) for Annual Exam.      Who to contact     If you have questions or need follow up information about today's clinic visit or your schedule please contact [facility] directly at [phone].  Normal or non-critical lab and imaging results will be communicated to you by MyChart, letter or phone within 4 business days after the clinic has received the results. If you do not hear from us within 7 days, please contact the clinic through MyChart or phone. If you have a critical or abnormal lab result, we will notify you by phone as soon as possible.  Submit refill requests through [employer] or call your pharmacy and they will forward the refill request to us. Please allow 3 business days for your refill to be completed.          Additional Information  "About Your Visit        [facility] Information     [facility] lets you send messages to your doctor, view your test results, renew your prescriptions, schedule appointments and more. To sign up, go to [email or web address] . Click on \"Log in\" on the left side of the screen, which will take you to the Welcome page. Then click on \"Sign up Now\" on the right side of the page.     You will be asked to enter the access code listed below, as well as some personal information. Please follow the directions to create your username and password.     Your access code is: [email or web address]  Expires: 2017 11:08 AM     Your access code will  in 90 days. If you need help or a new code, please call your [place] clinic or [phone].        Care EveryWhere ID     This is your Care EveryWhere ID. This could be used by other organizations to access your [facility] [place] medical records  [id number]        Your Vitals Were     Height Last Period BMI (Body Mass Index)             5' 7\" (1.702 m) 2016 25.06 kg/m2          Blood Pressure from Last 3 Encounters:   17 112/70   17 113/79   17 110/70    Weight from Last 3 Encounters:   17 160 lb (72.6 kg)   17 175 lb (79.4 kg)   17 176 lb (79.8 kg)              Today, you had the following     No orders found for display         Today's Medication Changes          These changes are accurate as of: 17 10:13 AM.  If you have any questions, ask your nurse or doctor.               Start taking these medicines.        Dose/Directions    SUMAtriptan 100 MG tablet   Commonly known as:  IMITREX   Used for:  Migraine without status migrainosus, not intractable, unspecified migraine type   Started by:  Kayla Campos MD        Dose:  100 mg   Take 1 tablet (100 mg) by mouth at onset of headache for migraine May repeat in 2 hours. Max 2 tablets/24 hours.   Quantity:  8 tablet   Refills:  1         Stop taking these medicines if you haven't already. Please " contact your care team if you have questions.     prenatal multivitamin  plus iron 27-0.8 MG Tabs per tablet   Stopped by:  Kayla Campos MD                Where to get your medicines      These medications were sent to [place] [address] [facility] - [place], [place] - [address]  [address] [address], [facility] [address]     Phone:  [phone]     SUMAtriptan 100 MG tablet                Primary Care Provider Office Phone # Fax #    Lalo Lopez [phone]       [facility] [address] [facility] [place] [address] [address]        Thank you!     Thank you for choosing [facility] [place]  for your care. Our goal is always to provide you with excellent care. Hearing back from our patients is one way we can continue to improve our services. Please take a few minutes to complete the written survey that you may receive in the mail after your visit with us. Thank you!             Your Updated Medication List - Protect others around you: Learn how to safely use, store and throw away your medicines at www.disposemymeds.org.          This list is accurate as of: 6/1/17 10:13 AM.  Always use your most recent med list.                   Brand Name Dispense Instructions for use    MAGNESIUM OXIDE PO          SUMAtriptan 100 MG tablet    IMITREX    8 tablet    Take 1 tablet (100 mg) by mouth at onset of headache for migraine May repeat in 2 hours. Max 2 tablets/24 hours.       vitamin B complex with vitamin C Tabs tablet      Take 1 tablet by mouth daily       WOMENS MULTI VITAMIN & MINERAL PO

## 2017-06-01 NOTE — PATIENT INSTRUCTIONS
Follow up with your primary care provider for your other medical problems.  Continue self breast exam.  Increase physical activity and exercise.  Usual safety and preventative measures counseling done.  Weight loss encouraged.  Last pap smear (2013) was normal and negative for the DNA of high risk HPV subtypes.  No pap was obtained this year.  This was discussed with the patient and she agrees with the plan.  Will continue to monitor left incision pain over the next few weeks --if were to worsen or to persist, will consider imaging of this area.

## 2017-06-02 ASSESSMENT — PATIENT HEALTH QUESTIONNAIRE - PHQ9: SUM OF ALL RESPONSES TO PHQ QUESTIONS 1-9: 0

## 2017-06-02 ASSESSMENT — ANXIETY QUESTIONNAIRES: GAD7 TOTAL SCORE: 0

## 2018-01-30 ENCOUNTER — TRANSFERRED RECORDS (OUTPATIENT)
Dept: HEALTH INFORMATION MANAGEMENT | Facility: CLINIC | Age: 38
End: 2018-01-30

## 2019-01-11 ENCOUNTER — OFFICE VISIT (OUTPATIENT)
Dept: OBGYN | Facility: CLINIC | Age: 39
End: 2019-01-11
Payer: COMMERCIAL

## 2019-01-11 VITALS
BODY MASS INDEX: 25.74 KG/M2 | WEIGHT: 164 LBS | SYSTOLIC BLOOD PRESSURE: 120 MMHG | HEIGHT: 67 IN | DIASTOLIC BLOOD PRESSURE: 74 MMHG

## 2019-01-11 DIAGNOSIS — Z01.419 ENCOUNTER FOR GYNECOLOGICAL EXAMINATION WITHOUT ABNORMAL FINDING: Primary | ICD-10-CM

## 2019-01-11 PROCEDURE — 99395 PREV VISIT EST AGE 18-39: CPT | Performed by: OBSTETRICS & GYNECOLOGY

## 2019-01-11 PROCEDURE — 87624 HPV HI-RISK TYP POOLED RSLT: CPT | Performed by: OBSTETRICS & GYNECOLOGY

## 2019-01-11 PROCEDURE — G0145 SCR C/V CYTO,THINLAYER,RESCR: HCPCS | Performed by: OBSTETRICS & GYNECOLOGY

## 2019-01-11 RX ORDER — AMITRIPTYLINE HYDROCHLORIDE 10 MG/1
10 TABLET ORAL AT BEDTIME
COMMUNITY
End: 2022-04-14

## 2019-01-11 ASSESSMENT — ANXIETY QUESTIONNAIRES
IF YOU CHECKED OFF ANY PROBLEMS ON THIS QUESTIONNAIRE, HOW DIFFICULT HAVE THESE PROBLEMS MADE IT FOR YOU TO DO YOUR WORK, TAKE CARE OF THINGS AT HOME, OR GET ALONG WITH OTHER PEOPLE: NOT DIFFICULT AT ALL
GAD7 TOTAL SCORE: 0
1. FEELING NERVOUS, ANXIOUS, OR ON EDGE: NOT AT ALL
6. BECOMING EASILY ANNOYED OR IRRITABLE: NOT AT ALL
7. FEELING AFRAID AS IF SOMETHING AWFUL MIGHT HAPPEN: NOT AT ALL
5. BEING SO RESTLESS THAT IT IS HARD TO SIT STILL: NOT AT ALL
3. WORRYING TOO MUCH ABOUT DIFFERENT THINGS: NOT AT ALL
2. NOT BEING ABLE TO STOP OR CONTROL WORRYING: NOT AT ALL

## 2019-01-11 ASSESSMENT — MIFFLIN-ST. JEOR: SCORE: 1470.49

## 2019-01-11 ASSESSMENT — PATIENT HEALTH QUESTIONNAIRE - PHQ9
5. POOR APPETITE OR OVEREATING: NOT AT ALL
SUM OF ALL RESPONSES TO PHQ QUESTIONS 1-9: 0

## 2019-01-11 NOTE — PROGRESS NOTES
Yas is a 38 year old  female who presents for annual exam.     Besides routine health maintenance, she has no other health concerns today .    HPI:  Here today for yearly exam --doing well.  Regular, monthly menses x 5d.  Heavy first day of bleeding and then tapers quickly.  Minimal cramping and no intermenstrual bleeding/spotting.  +SA --no issues.  Had tubal ligation at time of last surgery.  Denies bowel or bladder concerns.    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  Older kids 7yo (Erna) and 9yo (Mian)  -staying active; running, yoga, tennis, etc at least 4-5d/wk; had cool sculpting done last week on abdomen due to concerns for excessive tissue above c/s scar  +SBE --no concerns; reviewed breast cancer screening; no family hx  No PCP  -declines flu shot today      GYNECOLOGIC HISTORY:    Patient's last menstrual period was 2018.  Her current contraception method is: tubal ligation.  She  reports that  has never smoked. she has never used smokeless tobacco.    Patient is sexually active.  STD testing offered?  Declined  Last PHQ-9 score on record =   PHQ-9 SCORE 2019   PHQ-9 Total Score 0     Last GAD7 score on record =   SANTIAGO-7 SCORE 2019   Total Score 0     Alcohol Score = 3    HEALTH MAINTENANCE:  Cholesterol: (No results found for: CHOL   Last Mammo: NA, Result: not applicable, Next Mammo: NA   Pap: (  Lab Results   Component Value Date    PAP NIL 10/11/2012    8/14/13 WNL HPV (-)neg  Colonoscopy:  NA, Result: not applicable, Next Colonoscopy: NA years.  Dexa:  NA    Health maintenance updated:  yes    HISTORY:  Obstetric History       T3      L3     SAB1   TAB0   Ectopic0   Multiple0   Live Births3       # Outcome Date GA Lbr Daniel/2nd Weight Sex Delivery Anes PTL Lv   4 Term 17 39w3d  3.68 kg (8 lb 1.8 oz) F CS-LTranv Spinal  ABDIRIZAK      Name: Carla      Apgar1:  9                Apgar5: 9   3 SAB 10/2014 7w0d          2 Term 12 39w0d  " 4.394 kg (9 lb 11 oz) F CS-LTranv Spinal  ABDIRIZAK      Name: Erna      Apgar1:  8                Apgar5: 9   1 Term 07/10/10 41w0d  3.771 kg (8 lb 5 oz) M -SEC   ABDIRIZAK      Name: Mian          Patient Active Problem List   Diagnosis     HSV-1 infection     Migraine     Past Surgical History:   Procedure Laterality Date      SECTION        SECTION  2012    Procedure:  SECTION;  REPEAT  SECTION;  Surgeon: Lalo Lopez MD;  Location:  [facility]     COMBINED  SECTION, SALPINGECTOMY BILATERAL N/A 2017    Procedure: COMBINED  SECTION, SALPINGECTOMY BILATERAL;  REPEAT  SECTION, BILATERAL SALPINGECTOMY ;  Surgeon: Kayla Campos MD;  Location:  [facility]     cool sculpting  2019      Social History     Tobacco Use     Smoking status: Never Smoker     Smokeless tobacco: Never Used   Substance Use Topics     Alcohol use: No     Alcohol/week: 0.0 oz      Problem (# of Occurrences) Relation (Name,Age of Onset)    Cancer (1) Father: Father had multiple mylema and passed away 2016.    Hyperlipidemia (2) Sister, Mother            Current Outpatient Medications   Medication Sig     amitriptyline (ELAVIL) 10 MG tablet Take 10 mg by mouth At Bedtime     MAGNESIUM OXIDE PO      Multiple Vitamins-Minerals (WOMENS MULTI VITAMIN & MINERAL PO)      SUMAtriptan (IMITREX) 100 MG tablet Take 1 tablet (100 mg) by mouth at onset of headache for migraine May repeat in 2 hours. Max 2 tablets/24 hours.     vitamin  B complex with vitamin C (VITAMIN  B COMPLEX) TABS Take 1 tablet by mouth daily     No current facility-administered medications for this visit.      No Known Allergies    Past medical, surgical, social and family histories were reviewed and updated in EPIC.    ROS:   12 point review of systems negative other than symptoms noted below.  Neurologic: Headaches    EXAM:  /74   Ht 1.708 m (5' 7.25\")   Wt 74.4 kg (164 lb)   LMP 2018   " Breastfeeding? No   BMI 25.50 kg/m     BMI: Body mass index is 25.5 kg/m .    PHYSICAL EXAM:  Constitutional:  Appearance: Well nourished, well developed, alert, in no acute distress  Neck:  Lymph Nodes:  No lymphadenopathy present    Thyroid:  Gland size normal, nontender, no nodules or masses present  on palpation  Chest:  Respiratory Effort:  Breathing unlabored  Cardiovascular:    Heart: Auscultation:  Regular rate, normal rhythm, no murmurs present  Breasts: Inspection of Breasts:  No lymphadenopathy present., Palpation of Breasts and Axillae:  No masses present on palpation, no breast tenderness., Axillary Lymph Nodes:  No lymphadenopathy present. and No nodularity, asymmetry or nipple discharge bilaterally.  Gastrointestinal:   Abdominal Examination:  Abdomen nontender to palpation, tone normal without rigidity or guarding, no masses present, umbilicus without lesions   Liver and Spleen:  No hepatomegaly present, liver nontender to palpation    Hernias:  No hernias present  Lymphatic: Lymph Nodes:  No other lymphadenopathy present  Skin:  General Inspection:  No rashes present, no lesions present, no areas of  discoloration    Genitalia and Groin:  No rashes present, no lesions present, no areas of  discoloration, no masses present  Neurologic/Psychiatric:    Mental Status:  Oriented X3     Pelvic Exam:  External Genitalia:     Normal appearance for age, no discharge present, no tenderness present, no inflammatory lesions present, color normal  Vagina:     Normal vaginal vault without central or paravaginal defects, no discharge present, no inflammatory lesions present, no masses present  Bladder:     Nontender to palpation  Urethra:   Urethral Body:  Urethra palpation normal, urethra structural support normal   Urethral Meatus:  No erythema or lesions present  Cervix:     Appearance healthy, no lesions present, nontender to palpation, no bleeding present  Uterus:     Uterus: firm, normal sized and  nontender, retroverted in position.   Adnexa:     No adnexal tenderness present, no adnexal masses present  Perineum:     Perineum within normal limits, no evidence of trauma, no rashes or skin lesions present  Anus:     Anus within normal limits, no hemorrhoids present  Inguinal Lymph Nodes:     No lymphadenopathy present  Pubic Hair:     Normal pubic hair distribution for age  Genitalia and Groin:     No rashes present, no lesions present, no areas of discoloration, no masses present      COUNSELING:   Reviewed preventive health counseling, as reflected in patient instructions  Special attention given to:        Regular exercise       Healthy diet/nutrition    BMI: Body mass index is 25.5 kg/m .  Weight management plan: Discussed healthy diet and exercise guidelines    ASSESSMENT:  38 year old female with satisfactory annual exam.    ICD-10-CM    1. Encounter for gynecological examination without abnormal finding Z01.419 Pap imaged thin layer screen with HPV - recommended age 30 - 65     HPV High Risk Types DNA Cervical       PLAN:  Patient Instructions   Continue self breast exam.  Increase physical activity and exercise.  Lab and pap smear results will be called to the patient.  Co-testing done today and will repeat in 5yrs if negative.  Usual safety and preventative measures counseling done.  Flu shot declined.  BMI >25      Kayla Campos MD

## 2019-01-11 NOTE — LETTER
January 19, 2019    Yas Beck  [address] [address]    Dear ,  This letter is regarding to your recent Pap smear (cervical cancer screening) and Human Papillomavirus (HPV) test.  We are happy to inform you that your Pap smear result is normal. Cervical cancer is closely linked with certain types of HPV. Your results showed no evidence of high-risk HPV.  Therefore we recommend you return in 5 years for your next pap smear and HPV test.  You will still need to return to the clinic every year for an annual exam and other preventive tests.  If you have additional questions regarding this result, please call our registered nurse, Elaine at [phone].  Sincerely,    Kayla Campos MD/jamar

## 2019-01-11 NOTE — PATIENT INSTRUCTIONS
Continue self breast exam.  Increase physical activity and exercise.  Lab and pap smear results will be called to the patient.  Co-testing done today and will repeat in 5yrs if negative.  Usual safety and preventative measures counseling done.  Flu shot declined.  BMI >25

## 2019-01-12 ASSESSMENT — ANXIETY QUESTIONNAIRES: GAD7 TOTAL SCORE: 0

## 2019-01-16 LAB
COPATH REPORT: NORMAL
PAP: NORMAL

## 2019-01-18 PROBLEM — R87.619 ABNORMAL PAP SMEAR OF CERVIX: Status: RESOLVED | Noted: 2019-01-18 | Resolved: 2019-01-18

## 2019-01-18 LAB
FINAL DIAGNOSIS: NORMAL
HPV HR 12 DNA CVX QL NAA+PROBE: NEGATIVE
HPV16 DNA SPEC QL NAA+PROBE: NEGATIVE
HPV18 DNA SPEC QL NAA+PROBE: NEGATIVE
SPECIMEN DESCRIPTION: NORMAL
SPECIMEN SOURCE CVX/VAG CYTO: NORMAL

## 2022-02-02 ENCOUNTER — TELEPHONE (OUTPATIENT)
Dept: OBGYN | Facility: CLINIC | Age: 42
End: 2022-02-02
Payer: COMMERCIAL

## 2022-02-02 NOTE — TELEPHONE ENCOUNTER
Called pt  She states the last 3 days she has had bright red blood in the stool.  Painful at the rectum w BM's only.  Denies black stool.  Unsure if has external hemorrhoids.   Explained what bright red blood could indicate on lower GI tract.  Highly recommended PCP/internist for problem visit at this time to start evaluation rather than wait for her annual w Dr Campos.    Gave recommendations for PCP per Dr Campos list of recommendations.  Pt will check insurance and schedule.    Pt verbalized understanding, in agreement with plan, and voiced no further questions.  Jeanine Barros RN on 2/2/2022 at 11:48 AM

## 2022-04-14 ENCOUNTER — OFFICE VISIT (OUTPATIENT)
Dept: OBGYN | Facility: CLINIC | Age: 42
End: 2022-04-14
Payer: COMMERCIAL

## 2022-04-14 VITALS
HEIGHT: 67 IN | WEIGHT: 161 LBS | BODY MASS INDEX: 25.27 KG/M2 | SYSTOLIC BLOOD PRESSURE: 120 MMHG | DIASTOLIC BLOOD PRESSURE: 68 MMHG

## 2022-04-14 DIAGNOSIS — N92.0 MENORRHAGIA WITH REGULAR CYCLE: ICD-10-CM

## 2022-04-14 DIAGNOSIS — Z01.419 ENCOUNTER FOR GYNECOLOGICAL EXAMINATION WITHOUT ABNORMAL FINDING: Primary | ICD-10-CM

## 2022-04-14 PROCEDURE — 99385 PREV VISIT NEW AGE 18-39: CPT | Performed by: OBSTETRICS & GYNECOLOGY

## 2022-04-14 ASSESSMENT — ANXIETY QUESTIONNAIRES
2. NOT BEING ABLE TO STOP OR CONTROL WORRYING: NOT AT ALL
3. WORRYING TOO MUCH ABOUT DIFFERENT THINGS: NOT AT ALL
7. FEELING AFRAID AS IF SOMETHING AWFUL MIGHT HAPPEN: NOT AT ALL
GAD7 TOTAL SCORE: 0
6. BECOMING EASILY ANNOYED OR IRRITABLE: NOT AT ALL
IF YOU CHECKED OFF ANY PROBLEMS ON THIS QUESTIONNAIRE, HOW DIFFICULT HAVE THESE PROBLEMS MADE IT FOR YOU TO DO YOUR WORK, TAKE CARE OF THINGS AT HOME, OR GET ALONG WITH OTHER PEOPLE: NOT DIFFICULT AT ALL
1. FEELING NERVOUS, ANXIOUS, OR ON EDGE: NOT AT ALL
5. BEING SO RESTLESS THAT IT IS HARD TO SIT STILL: NOT AT ALL

## 2022-04-14 ASSESSMENT — PATIENT HEALTH QUESTIONNAIRE - PHQ9
5. POOR APPETITE OR OVEREATING: NOT AT ALL
SUM OF ALL RESPONSES TO PHQ QUESTIONS 1-9: 0

## 2022-04-14 NOTE — PROGRESS NOTES
Yas is a 41 year old  female who presents for annual exam.     Besides routine health maintenance,  she would like to discuss heavy periods.    HPI:  Here today for yearly exam --has not been to our office since 2019.  Doing well.  Struggling with heavier periods.  Monthly menses x 5-6d.  Usually 2nd and 3rd day are quite heavy.  Often changing tampons every 1-2hrs during those days.  +clots.  Some cramping.  Then taper quickly.  Has had tubal ligation and really not interested in hormonal medications.  Hx of migraines and has not had any headache issues for the past couple of years.  Has heard bad things about ablation regarding pain, ineffectiveness, etc.  Wondering about hysterectomy.  +SA --no issues.  Denies bowel/bladder issues.  Leaks only with jumping.  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  -staying active with kiddos  -will be due for first mammogram later this year; no personal or family hx breast disease  No PCP --no medications; open to checking fasting bloodwork next year    **of note, Rachel got very lightheaded and pale during our discussion today; never passed out or loss of consciousness; responded to lying down, juice/water, alcohol wipe; has always struggled with anything medical --blood draws, etc**        GYNECOLOGIC HISTORY:    Patient's last menstrual period was 2022 (exact date).    Regular menses? yes  Menses every 28 days.  Length of menses: 6 days    Her current contraception method is: tubal ligation.  She  reports that she has never smoked. She has never used smokeless tobacco.    Patient is sexually active.  STD testing offered?  Declined  Last PHQ-9 score on record =   PHQ-9 SCORE 2022   PHQ-9 Total Score 0     Last GAD7 score on record =   SANTIAGO-7 SCORE 2022   Total Score 0     Alcohol Score =  1    HEALTH MAINTENANCE:  Cholesterol: (No results found for: CHOL   Last Mammo: Not applicable, Result: Not applicable, Next Mammo: Due at age 42   Pap:   Lab Results   Component Value Date    PAP NIL 2019    PAP NIL 10/11/2012   1/11/19 WNL HPV (-)neg  Colonoscopy:  NA, Result: Not applicable, Next Colonoscopy: NA years.  Dexa:  NA    Health maintenance updated:  yes    HISTORY:  OB History    Para Term  AB Living   4 3 3 0 1 3   SAB IAB Ectopic Multiple Live Births   1 0 0 0 3      # Outcome Date GA Lbr Daniel/2nd Weight Sex Delivery Anes PTL Lv   4 Term 17 39w3d  3.68 kg (8 lb 1.8 oz) F CS-LTranv Spinal  ABDIRIZAK      Name: Carla      Apgar1: 9  Apgar5: 9   3 SAB 10/2014 7w0d          2 Term 12 39w0d  4.394 kg (9 lb 11 oz) F CS-LTranv Spinal  ABDIRIZAK      Name: Erna      Apgar1: 8  Apgar5: 9   1 Term 07/10/10 41w0d  3.771 kg (8 lb 5 oz) M -SEC   ABDIRIZAK      Birth Comments: Post-date induction --Failure to progress.      Name: Mian       Patient Active Problem List   Diagnosis     HSV-1 infection     Migraine     Past Surgical History:   Procedure Laterality Date      SECTION        SECTION  2012    Procedure:  SECTION;  REPEAT  SECTION;  Surgeon: Lalo Lpoez MD;  Location:  [facility]     COMBINED  SECTION, SALPINGECTOMY BILATERAL N/A 2017    Procedure: COMBINED  SECTION, SALPINGECTOMY BILATERAL;  REPEAT  SECTION, BILATERAL SALPINGECTOMY ;  Surgeon: Kayla Campos MD;  Location:  [facility]     cool sculpting  2019      Social History     Tobacco Use     Smoking status: Never Smoker     Smokeless tobacco: Never Used   Substance Use Topics     Alcohol use: No     Alcohol/week: 0.0 standard drinks      Problem (# of Occurrences) Relation (Name,Age of Onset)    Cancer (1) Father: Father had multiple mylema and passed away 2016.    Heart Surgery (1) Daughter (1): 2018-had a AFT (extra valve that didnt  "close after birth)    Hyperlipidemia (2) Mother, Sister    No Known Problems (6) Brother, Maternal Grandmother, Maternal Grandfather, Paternal Grandmother, Paternal Grandfather, Other            Current Outpatient Medications   Medication Sig     MAGNESIUM OXIDE PO      Multiple Vitamins-Minerals (WOMENS MULTI VITAMIN & MINERAL PO)      No current facility-administered medications for this visit.     No Known Allergies    Past medical, surgical, social and family histories were reviewed and updated in EPIC.    ROS:   12 point review of systems negative other than symptoms noted below or in the HPI.  Genitourinary: Heavy Bleeding with Period  No urinary frequency or dysuria, bladder or kidney problems, POSITIVE for:, heavy periods    EXAM:  /68   Ht 1.708 m (5' 7.25\")   Wt 73 kg (161 lb)   LMP 04/11/2022 (Exact Date)   Breastfeeding No   BMI 25.03 kg/m     BMI: Body mass index is 25.03 kg/m .    PHYSICAL EXAM:  Constitutional:   Appearance: Well nourished, well developed, alert, in no acute distress  Neck:  Lymph Nodes:  No lymphadenopathy present    Thyroid:  Gland size normal, nontender, no nodules or masses present  on palpation  Chest:  Respiratory Effort:  Breathing unlabored  Cardiovascular:    Heart: Auscultation:  Regular rate, normal rhythm, no murmurs present  Breasts: Palpation of Breasts and Axillae:  No masses present on palpation, no breast tenderness., Axillary Lymph Nodes:  No lymphadenopathy present. and No nodularity, asymmetry or nipple discharge bilaterally.  Gastrointestinal:   Abdominal Examination:  Abdomen nontender to palpation, tone normal without rigidity or guarding, no masses present, umbilicus without lesions   Liver and Spleen:  No hepatomegaly present, liver nontender to palpation    Hernias:  No hernias present  Lymphatic: Lymph Nodes:  No other lymphadenopathy present  Skin:  General Inspection:  No rashes present, no lesions present, no areas of "  discoloration  Neurologic:    Mental Status:  Oriented X3.  Normal strength and tone, sensory exam                grossly normal, mentation intact and speech normal.    Psychiatric:   Mentation appears normal and affect normal/bright.         Pelvic Exam:  External Genitalia:     Normal appearance for age, no discharge present, no tenderness present, no inflammatory lesions present, color normal  Vagina:     Normal vaginal vault without central or paravaginal defects, no discharge present, no inflammatory lesions present, no masses present  Bladder:     Nontender to palpation  Urethra:   Urethral Body:  Urethra palpation normal, urethra structural support normal   Urethral Meatus:  No erythema or lesions present  Cervix:     Appearance healthy, no lesions present, nontender to palpation, no bleeding present  Uterus:     Uterus: firm, normal sized and nontender, anteverted in position.   Adnexa:     No adnexal tenderness present, no adnexal masses present  Perineum:     Perineum within normal limits, no evidence of trauma, no rashes or skin lesions present  Anus:     Anus within normal limits, no hemorrhoids present  Inguinal Lymph Nodes:     No lymphadenopathy present  Pubic Hair:     Normal pubic hair distribution for age  Genitalia and Groin:     No rashes present, no lesions present, no areas of discoloration, no masses present      COUNSELING:   Reviewed preventive health counseling, as reflected in patient instructions  Special attention given to:        Regular exercise       Healthy diet/nutrition    BMI: Body mass index is 25.03 kg/m .  Weight management plan: Discussed healthy diet and exercise guidelines    ASSESSMENT:  41 year old female with satisfactory annual exam.    ICD-10-CM    1. Encounter for gynecological examination without abnormal finding  Z01.419    2. Menorrhagia with regular cycle  N92.0        PLAN:  Patient Instructions   Follow up with your primary care provider for your other medical  problems.  Will plan to arrange first mammogram this fall or with next year's annual exam.  Will plan to check fasting bloodwork with next year's annual exam including lipids, fasting blood sugar and thyroid.  Continue self breast exam.  Increase physical activity and exercise.  Usual safety and preventative measures counseling done.  BMI >25  Last pap smear (2019) was normal and negative for the DNA of high risk HPV subtypes.  No pap was obtained this year.  This was discussed with the patient and she agrees with the plan.   Discussed management options with heavy and painful periods.  Discussed pelvic ultrasound to rule out any anatomic cause of heavy periods like fibroid or polyps.  Discussed medication management --not interested in any hormonal medications or IUD but would consider lysteda as needed during heavy bleeding days.  Also discussed options of ablation or hysterectomy.  Will review options and contact me as needed.        Kayla Campos MD

## 2022-04-14 NOTE — PATIENT INSTRUCTIONS
Follow up with your primary care provider for your other medical problems.  Will plan to arrange first mammogram this fall or with next year's annual exam.  Will plan to check fasting bloodwork with next year's annual exam including lipids, fasting blood sugar and thyroid.  Continue self breast exam.  Increase physical activity and exercise.  Usual safety and preventative measures counseling done.  BMI >25  Last pap smear (2019) was normal and negative for the DNA of high risk HPV subtypes.  No pap was obtained this year.  This was discussed with the patient and she agrees with the plan.   Discussed management options with heavy and painful periods.  Discussed pelvic ultrasound to rule out any anatomic cause of heavy periods like fibroid or polyps.  Discussed medication management --not interested in any hormonal medications or IUD but would consider lysteda as needed during heavy bleeding days.  Also discussed options of ablation or hysterectomy.  Will review options and contact me as needed.

## 2022-04-15 ASSESSMENT — ANXIETY QUESTIONNAIRES: GAD7 TOTAL SCORE: 0

## 2022-07-21 ENCOUNTER — TELEPHONE (OUTPATIENT)
Dept: OBGYN | Facility: CLINIC | Age: 42
End: 2022-07-21

## 2022-07-21 DIAGNOSIS — N92.0 MENORRHAGIA WITH REGULAR CYCLE: ICD-10-CM

## 2022-07-21 DIAGNOSIS — Z01.419 ENCOUNTER FOR GYNECOLOGICAL EXAMINATION WITHOUT ABNORMAL FINDING: Primary | ICD-10-CM

## 2022-09-26 PROBLEM — N92.0 MENORRHAGIA WITH REGULAR CYCLE: Status: ACTIVE | Noted: 2022-09-26

## 2022-09-27 ENCOUNTER — ANCILLARY PROCEDURE (OUTPATIENT)
Dept: ULTRASOUND IMAGING | Facility: CLINIC | Age: 42
End: 2022-09-27
Attending: OBSTETRICS & GYNECOLOGY
Payer: COMMERCIAL

## 2022-09-27 ENCOUNTER — PREP FOR PROCEDURE (OUTPATIENT)
Dept: OBGYN | Facility: CLINIC | Age: 42
End: 2022-09-27

## 2022-09-27 ENCOUNTER — OFFICE VISIT (OUTPATIENT)
Dept: OBGYN | Facility: CLINIC | Age: 42
End: 2022-09-27

## 2022-09-27 DIAGNOSIS — N92.0 MENORRHAGIA WITH REGULAR CYCLE: ICD-10-CM

## 2022-09-27 DIAGNOSIS — F41.9 ANXIETY: ICD-10-CM

## 2022-09-27 DIAGNOSIS — N92.0 MENORRHAGIA WITH REGULAR CYCLE: Primary | ICD-10-CM

## 2022-09-27 PROCEDURE — 99213 OFFICE O/P EST LOW 20 MIN: CPT | Mod: 25 | Performed by: OBSTETRICS & GYNECOLOGY

## 2022-09-27 PROCEDURE — 58340 CATHETER FOR HYSTEROGRAPHY: CPT | Performed by: OBSTETRICS & GYNECOLOGY

## 2022-09-27 PROCEDURE — 88305 TISSUE EXAM BY PATHOLOGIST: CPT | Performed by: STUDENT IN AN ORGANIZED HEALTH CARE EDUCATION/TRAINING PROGRAM

## 2022-09-27 PROCEDURE — 76831 ECHO EXAM UTERUS: CPT | Performed by: OBSTETRICS & GYNECOLOGY

## 2022-09-27 PROCEDURE — 58100 BIOPSY OF UTERUS LINING: CPT | Performed by: OBSTETRICS & GYNECOLOGY

## 2022-09-27 NOTE — PROGRESS NOTES
INDICATIONS:                                                    Is a pregnancy test required: No.  Was a consent obtained?  Yes    Having endometrial biopsy for menorrhagia   History of much heavier periods over the last few years.  Usually 2nd and 3rd day of period requiring changing of pads/tampons every 1-2hours.  Not interested in hormonal medications and has has tubal ligation for contraception.  Hoping to do ablation to help with heavy periods.    Would also like to discuss anxiety issues today.  Has always struggled a bit in the fall/winter season.  Generalized anxiety.   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  +racing heart, edginess, racing thoughts, etc.  Denies depression or sadness.  Bonding well with kids and .  Performing well as mother and wife.  Sleeping okay.  Eating/drinking well.  Has never used medications in the past.  Has friends who have used medications and done well.  Feels safe.  Denies SI/HI.    Results for orders placed or performed in visit on 09/27/22   US Hysterosonography     Status: None    Narrative    Gynecological Ultrasound Report  Pelvic U/S with Sonohysterography - Transvaginal  [facility]  Referring physician: Kayla Campos MD   Sonographer: Brittnee Spurling, RDMS  Indication:   Pre-ablation  LMP: No LMP recorded.    Gynecological Ultrasonography:   Uterus: anteverted.  Contour is smooth/regular.  Size: 9.7 x 5.4 x 4.0cm.    Endometrium: Thickness total 10.3mm  Findings: The cavity was normal  Right Ovary: 2.4 x 2.3 x 1.6cm.    Left Ovary: 4.1 x 2.9 x 1.9cm. Cyst w/ Septation 2.4 x 1.5 x 2.5 cm  Cul de Sac/Pouch of Frank: No free fluid  Sonohysterography Results:  Performed by: Kayla Campos MD             Assistant: Brittnee Spurling, RDMS  Catheter type: Feeding Tube     Fluid Used: Sterile saline                 Impression based on Sonohysterography:         The uterus and right  ovary were visualized and no abnormalities were seen.  Left ovary with small cyst measuring 2.4x1.5x2.5cm with thin septation.  The endometrium appeared normal.  Cavity without polyps or fibroids.  No free fluid.      Kayla Campos MD               PROCEDURE;                                                      A speculum was placed in the vagina and cervix prepped with betadine. A tenaculum was attached to the cervix. A small plastic 5 mm Pipelle syringe curette was inserted into the cervical canal. The uterus was sounded to 8 cm's. A vigorous four quadrant biopsy was performed, removing amount moderate of tissue.  Tissue was placed in formalin and will be sent to pathlogy.   At this time, flexible pediatric catheter was easily fed through os into the cavity.  Single tooth tenaculum and speculum were removed and vaginal ultrasound probe replaced.  Uterus was instilled with sterile with normal cavity noted.  See report below.  The probe was removed and procedure complete.     The patient tolerated the procedure  fairly well and she reported there was  cramping.      POST PROCEDURE;                                                      There  was no cramping at the time of discharge. She  tolerated the procedure well with minimal discomfort. There were no complications. Patient was discharged in stable condition.    Patient advised to call the clinic if severe pelvic pain, fever or heavy bleeding.    Patient Instructions   SIS findings discussed today following the procedure.  Rachel would like to move forward with Novasure ablation for heavy periods.  Discussed procedure and recovery in detail.    Also discussed anxiety issues/concerns today.  Discussed options for treatment of anxiety including medication and/or therapy/counseling.  Rachel would like to try low dose selective serotonin reuptake inhibitor in zoloft.  Discussed schedule and effects.  Discussed delay in effects of medication and possible side  effects.  Will start with 1/2 tablet for a few days and then increase to 50mg dosing nightly.  Rachel to follow up with me in 4-6 weeks for medication/anxiety follow up.      Kayla Campos MD

## 2022-09-27 NOTE — PATIENT INSTRUCTIONS
SIS findings discussed today following the procedure.  Rachel would like to move forward with Novasure ablation for heavy periods.  Discussed procedure and recovery in detail.    Also discussed anxiety issues/concerns today.  Discussed options for treatment of anxiety including medication and/or therapy/counseling.  Rachel would like to try low dose selective serotonin reuptake inhibitor in zoloft.  Discussed schedule and effects.  Discussed delay in effects of medication and possible side effects.  Will start with 1/2 tablet for a few days and then increase to 50mg dosing nightly.  Rachel to follow up with me in 4-6 weeks for medication/anxiety follow up.

## 2022-09-28 ENCOUNTER — TELEPHONE (OUTPATIENT)
Dept: OBGYN | Facility: CLINIC | Age: 42
End: 2022-09-28

## 2022-09-28 NOTE — TELEPHONE ENCOUNTER
ERAS BAG GIVEN No  ERAS INSTRUCTIONS EXPLAINED No    ASSIST: none    H&P TO BE COMPLETED BY:   Surgeon  FOR H&P TO BE DONE BY SURGEON   UPDATE VISIT ON DATE AT HOSPITAL  SAME DAY/OBSERVATION/INPATIENT: STRAIGHT SAME DAY  EQUIPMENT: hysteroscope, novasure  VENDOR NEEDED AT CASE: if available  IF IUD WHAT BRAND none  LABS/SPECIAL INSTRUCTIONS: none  TIME OFF WORK: 1-2d  ESTIMATED DOCTOR TIME NEEDED IN MINUTES 40min  POST OP TO BE SCHEDULED AT 4 WEEKS AFTER SX APPOINTMENT LENGTH IN MINUTES 20

## 2022-09-28 NOTE — TELEPHONE ENCOUNTER
Type of surgery: DIAG HSC NOVASURE ALBLATION  Location of surgery: [facility]  Date and time of surgery: 10/12/2022 9a  Surgeon: DOTTY  Pre-Op Appt Date: AT HOSPITAL  Post-Op Appt Date: 11/16/2022 11:40a   Packet sent out: MAILED 9/28/2022  Pre-cert/Authorization completed:  TBD  Date: 9/28/2022 Spoke w/Lidia    COVID TEST 10/10/2022 HOME    Laura Nazario  Surgery Scheduler    CPT 68750

## 2022-09-29 LAB
PATH REPORT.COMMENTS IMP SPEC: NORMAL
PATH REPORT.COMMENTS IMP SPEC: NORMAL
PATH REPORT.FINAL DX SPEC: NORMAL
PATH REPORT.GROSS SPEC: NORMAL
PATH REPORT.MICROSCOPIC SPEC OTHER STN: NORMAL
PATH REPORT.RELEVANT HX SPEC: NORMAL
PHOTO IMAGE: NORMAL

## 2022-10-07 NOTE — TELEPHONE ENCOUNTER
Patient left message, need to reschedule surgery.     Rescheduled with Violette at [facility] to 11/30/2022 @ 2:40 pm  Post changed to 12/28/2022    Message left for patient to call back to confirm date and times

## 2022-10-25 NOTE — TELEPHONE ENCOUNTER
RAUL with below information and requested CB     Laura Nazario  Surgery Scheduler      There has been a change to the policy in regard to when you should stop drinking fluids.  Those changes are as follows    Still nothing to eat after midnight.    Nothing to drink after midnight other than water up to 4 hours prior to surgery time.     Nothing to drink after 10:40a. Also no gum, mints, candy etc.    You may brush your teeth before leaving for the hospital but again nothing to drink past 10:40a    Please give me a call or reply to let me know you received these new instructions..

## 2022-10-26 NOTE — TELEPHONE ENCOUNTER
Per Rand at [facility] we can move this case to 1:20p.    LVM for pt with request for CB and gave updated NPO times    No liquids past 9:20a    Laura Nazario  Surgery Scheduler

## 2022-10-28 NOTE — TELEPHONE ENCOUNTER
Spoke with patient - time change to 11 am, check in at 9 am - NPO after Midnight and nothing to drink after 7 am

## 2022-11-29 ENCOUNTER — ANESTHESIA EVENT (OUTPATIENT)
Dept: SURGERY | Facility: CLINIC | Age: 42
End: 2022-11-29
Payer: COMMERCIAL

## 2022-11-30 ENCOUNTER — ANESTHESIA (OUTPATIENT)
Dept: SURGERY | Facility: CLINIC | Age: 42
End: 2022-11-30
Payer: COMMERCIAL

## 2022-11-30 ENCOUNTER — HOSPITAL ENCOUNTER (OUTPATIENT)
Facility: CLINIC | Age: 42
Discharge: HOME | End: 2022-11-30
Attending: OBSTETRICS & GYNECOLOGY | Admitting: OBSTETRICS & GYNECOLOGY
Payer: COMMERCIAL

## 2022-11-30 VITALS
BODY MASS INDEX: 25.72 KG/M2 | RESPIRATION RATE: 22 BRPM | DIASTOLIC BLOOD PRESSURE: 88 MMHG | WEIGHT: 163.9 LBS | TEMPERATURE: 98 F | SYSTOLIC BLOOD PRESSURE: 134 MMHG | HEART RATE: 66 BPM | HEIGHT: 67 IN | OXYGEN SATURATION: 98 %

## 2022-11-30 DIAGNOSIS — N92.0 MENORRHAGIA WITH REGULAR CYCLE: Primary | ICD-10-CM

## 2022-11-30 LAB
B-HCG SERPL-ACNC: <1 IU/L (ref 0–5)
HGB BLD-MCNC: 13.5 G/DL (ref 11.7–15.7)

## 2022-11-30 PROCEDURE — C1888 ENDOVAS NON-CARDIAC ABL CATH: HCPCS | Performed by: OBSTETRICS & GYNECOLOGY

## 2022-11-30 PROCEDURE — 58563 HYSTEROSCOPY ABLATION: CPT | Performed by: OBSTETRICS & GYNECOLOGY

## 2022-11-30 PROCEDURE — 999N000141 HC STATISTIC PRE-PROCEDURE NURSING ASSESSMENT: Performed by: OBSTETRICS & GYNECOLOGY

## 2022-11-30 PROCEDURE — 272N000001 HC OR GENERAL SUPPLY STERILE: Performed by: OBSTETRICS & GYNECOLOGY

## 2022-11-30 PROCEDURE — 250N000013 HC RX MED GY IP 250 OP 250 PS 637: Performed by: OBSTETRICS & GYNECOLOGY

## 2022-11-30 PROCEDURE — 250N000011 HC RX IP 250 OP 636: Performed by: NURSE ANESTHETIST, CERTIFIED REGISTERED

## 2022-11-30 PROCEDURE — 258N000003 HC RX IP 258 OP 636: Performed by: NURSE ANESTHETIST, CERTIFIED REGISTERED

## 2022-11-30 PROCEDURE — 84702 CHORIONIC GONADOTROPIN TEST: CPT | Performed by: OBSTETRICS & GYNECOLOGY

## 2022-11-30 PROCEDURE — 360N000076 HC SURGERY LEVEL 3, PER MIN: Performed by: OBSTETRICS & GYNECOLOGY

## 2022-11-30 PROCEDURE — 710N000009 HC RECOVERY PHASE 1, LEVEL 1, PER MIN: Performed by: OBSTETRICS & GYNECOLOGY

## 2022-11-30 PROCEDURE — 250N000011 HC RX IP 250 OP 636: Performed by: ANESTHESIOLOGY

## 2022-11-30 PROCEDURE — 250N000009 HC RX 250: Performed by: NURSE ANESTHETIST, CERTIFIED REGISTERED

## 2022-11-30 PROCEDURE — 250N000025 HC SEVOFLURANE, PER MIN: Performed by: OBSTETRICS & GYNECOLOGY

## 2022-11-30 PROCEDURE — 710N000012 HC RECOVERY PHASE 2, PER MINUTE: Performed by: OBSTETRICS & GYNECOLOGY

## 2022-11-30 PROCEDURE — 250N000009 HC RX 250: Performed by: OBSTETRICS & GYNECOLOGY

## 2022-11-30 PROCEDURE — 370N000017 HC ANESTHESIA TECHNICAL FEE, PER MIN: Performed by: OBSTETRICS & GYNECOLOGY

## 2022-11-30 PROCEDURE — 258N000001 HC RX 258: Performed by: OBSTETRICS & GYNECOLOGY

## 2022-11-30 PROCEDURE — 36415 COLL VENOUS BLD VENIPUNCTURE: CPT | Performed by: OBSTETRICS & GYNECOLOGY

## 2022-11-30 PROCEDURE — 85018 HEMOGLOBIN: CPT | Performed by: OBSTETRICS & GYNECOLOGY

## 2022-11-30 RX ORDER — NALBUPHINE HYDROCHLORIDE 10 MG/ML
2.5-5 INJECTION, SOLUTION INTRAMUSCULAR; INTRAVENOUS; SUBCUTANEOUS EVERY 6 HOURS PRN
Status: DISCONTINUED | OUTPATIENT
Start: 2022-11-30 | End: 2022-11-30

## 2022-11-30 RX ORDER — ONDANSETRON 2 MG/ML
4 INJECTION INTRAMUSCULAR; INTRAVENOUS EVERY 30 MIN PRN
Status: DISCONTINUED | OUTPATIENT
Start: 2022-11-30 | End: 2022-11-30 | Stop reason: HOSPADM

## 2022-11-30 RX ORDER — HYDROMORPHONE HCL IN WATER/PF 6 MG/30 ML
0.2 PATIENT CONTROLLED ANALGESIA SYRINGE INTRAVENOUS EVERY 5 MIN PRN
Status: DISCONTINUED | OUTPATIENT
Start: 2022-11-30 | End: 2022-11-30 | Stop reason: HOSPADM

## 2022-11-30 RX ORDER — ACETAMINOPHEN 325 MG/1
975 TABLET ORAL ONCE
Status: DISCONTINUED | OUTPATIENT
Start: 2022-11-30 | End: 2022-11-30 | Stop reason: HOSPADM

## 2022-11-30 RX ORDER — SODIUM CHLORIDE, SODIUM LACTATE, POTASSIUM CHLORIDE, CALCIUM CHLORIDE 600; 310; 30; 20 MG/100ML; MG/100ML; MG/100ML; MG/100ML
INJECTION, SOLUTION INTRAVENOUS CONTINUOUS
Status: DISCONTINUED | OUTPATIENT
Start: 2022-11-30 | End: 2022-11-30 | Stop reason: HOSPADM

## 2022-11-30 RX ORDER — BUPIVACAINE HYDROCHLORIDE 7.5 MG/ML
INJECTION, SOLUTION INTRASPINAL
Status: DISCONTINUED | OUTPATIENT
Start: 2022-11-30 | End: 2022-11-30

## 2022-11-30 RX ORDER — HYDROMORPHONE HCL IN WATER/PF 6 MG/30 ML
0.4 PATIENT CONTROLLED ANALGESIA SYRINGE INTRAVENOUS EVERY 5 MIN PRN
Status: DISCONTINUED | OUTPATIENT
Start: 2022-11-30 | End: 2022-11-30 | Stop reason: HOSPADM

## 2022-11-30 RX ORDER — IBUPROFEN 800 MG/1
800 TABLET, FILM COATED ORAL EVERY 6 HOURS PRN
Qty: 30 TABLET | Refills: 0 | Status: SHIPPED | OUTPATIENT
Start: 2022-11-30

## 2022-11-30 RX ORDER — OXYCODONE HYDROCHLORIDE 5 MG/1
5 TABLET ORAL
Status: COMPLETED | OUTPATIENT
Start: 2022-11-30 | End: 2022-11-30

## 2022-11-30 RX ORDER — MORPHINE SULFATE 1 MG/ML
INJECTION, SOLUTION EPIDURAL; INTRATHECAL; INTRAVENOUS
Status: DISCONTINUED | OUTPATIENT
Start: 2022-11-30 | End: 2022-11-30

## 2022-11-30 RX ORDER — ACETAMINOPHEN 325 MG/1
975 TABLET ORAL ONCE
Status: COMPLETED | OUTPATIENT
Start: 2022-11-30 | End: 2022-11-30

## 2022-11-30 RX ORDER — IBUPROFEN 400 MG/1
800 TABLET, FILM COATED ORAL ONCE
Status: DISCONTINUED | OUTPATIENT
Start: 2022-11-30 | End: 2022-11-30 | Stop reason: HOSPADM

## 2022-11-30 RX ORDER — OXYCODONE HYDROCHLORIDE 5 MG/1
5-10 TABLET ORAL EVERY 4 HOURS PRN
Qty: 6 TABLET | Refills: 0 | Status: SHIPPED | OUTPATIENT
Start: 2022-11-30 | End: 2023-04-18

## 2022-11-30 RX ORDER — ACETAMINOPHEN 325 MG/1
975 TABLET ORAL EVERY 6 HOURS PRN
Qty: 50 TABLET | Refills: 0 | Status: SHIPPED | OUTPATIENT
Start: 2022-11-30

## 2022-11-30 RX ORDER — PROPOFOL 10 MG/ML
INJECTION, EMULSION INTRAVENOUS PRN
Status: DISCONTINUED | OUTPATIENT
Start: 2022-11-30 | End: 2022-11-30

## 2022-11-30 RX ORDER — ONDANSETRON 2 MG/ML
4 INJECTION INTRAMUSCULAR; INTRAVENOUS EVERY 6 HOURS PRN
Status: DISCONTINUED | OUTPATIENT
Start: 2022-11-30 | End: 2022-11-30

## 2022-11-30 RX ORDER — FENTANYL CITRATE 50 UG/ML
INJECTION, SOLUTION INTRAMUSCULAR; INTRAVENOUS
Status: DISCONTINUED | OUTPATIENT
Start: 2022-11-30 | End: 2022-11-30

## 2022-11-30 RX ORDER — FENTANYL CITRATE 50 UG/ML
INJECTION, SOLUTION INTRAMUSCULAR; INTRAVENOUS PRN
Status: DISCONTINUED | OUTPATIENT
Start: 2022-11-30 | End: 2022-11-30

## 2022-11-30 RX ORDER — FENTANYL CITRATE 0.05 MG/ML
25 INJECTION, SOLUTION INTRAMUSCULAR; INTRAVENOUS EVERY 5 MIN PRN
Status: DISCONTINUED | OUTPATIENT
Start: 2022-11-30 | End: 2022-11-30 | Stop reason: HOSPADM

## 2022-11-30 RX ORDER — DEXAMETHASONE SODIUM PHOSPHATE 4 MG/ML
INJECTION, SOLUTION INTRA-ARTICULAR; INTRALESIONAL; INTRAMUSCULAR; INTRAVENOUS; SOFT TISSUE PRN
Status: DISCONTINUED | OUTPATIENT
Start: 2022-11-30 | End: 2022-11-30

## 2022-11-30 RX ORDER — FENTANYL CITRATE 0.05 MG/ML
50 INJECTION, SOLUTION INTRAMUSCULAR; INTRAVENOUS EVERY 5 MIN PRN
Status: DISCONTINUED | OUTPATIENT
Start: 2022-11-30 | End: 2022-11-30 | Stop reason: HOSPADM

## 2022-11-30 RX ORDER — SODIUM CHLORIDE, SODIUM LACTATE, POTASSIUM CHLORIDE, CALCIUM CHLORIDE 600; 310; 30; 20 MG/100ML; MG/100ML; MG/100ML; MG/100ML
INJECTION, SOLUTION INTRAVENOUS CONTINUOUS PRN
Status: DISCONTINUED | OUTPATIENT
Start: 2022-11-30 | End: 2022-11-30

## 2022-11-30 RX ORDER — PROPOFOL 10 MG/ML
INJECTION, EMULSION INTRAVENOUS CONTINUOUS PRN
Status: DISCONTINUED | OUTPATIENT
Start: 2022-11-30 | End: 2022-11-30

## 2022-11-30 RX ORDER — MEPERIDINE HYDROCHLORIDE 25 MG/ML
12.5 INJECTION INTRAMUSCULAR; INTRAVENOUS; SUBCUTANEOUS
Status: DISCONTINUED | OUTPATIENT
Start: 2022-11-30 | End: 2022-11-30 | Stop reason: HOSPADM

## 2022-11-30 RX ORDER — EPHEDRINE SULFATE 50 MG/ML
5 INJECTION, SOLUTION INTRAMUSCULAR; INTRAVENOUS; SUBCUTANEOUS
Status: DISCONTINUED | OUTPATIENT
Start: 2022-11-30 | End: 2022-11-30

## 2022-11-30 RX ORDER — FENTANYL CITRATE 0.05 MG/ML
25 INJECTION, SOLUTION INTRAMUSCULAR; INTRAVENOUS
Status: DISCONTINUED | OUTPATIENT
Start: 2022-11-30 | End: 2022-11-30 | Stop reason: HOSPADM

## 2022-11-30 RX ORDER — LIDOCAINE HYDROCHLORIDE 20 MG/ML
INJECTION, SOLUTION INFILTRATION; PERINEURAL PRN
Status: DISCONTINUED | OUTPATIENT
Start: 2022-11-30 | End: 2022-11-30

## 2022-11-30 RX ORDER — ONDANSETRON 4 MG/1
4 TABLET, ORALLY DISINTEGRATING ORAL EVERY 6 HOURS PRN
Status: DISCONTINUED | OUTPATIENT
Start: 2022-11-30 | End: 2022-11-30

## 2022-11-30 RX ORDER — MAGNESIUM HYDROXIDE 1200 MG/15ML
LIQUID ORAL PRN
Status: DISCONTINUED | OUTPATIENT
Start: 2022-11-30 | End: 2022-11-30 | Stop reason: HOSPADM

## 2022-11-30 RX ORDER — KETOROLAC TROMETHAMINE 30 MG/ML
INJECTION, SOLUTION INTRAMUSCULAR; INTRAVENOUS PRN
Status: DISCONTINUED | OUTPATIENT
Start: 2022-11-30 | End: 2022-11-30

## 2022-11-30 RX ORDER — ONDANSETRON 4 MG/1
4 TABLET, ORALLY DISINTEGRATING ORAL EVERY 30 MIN PRN
Status: DISCONTINUED | OUTPATIENT
Start: 2022-11-30 | End: 2022-11-30 | Stop reason: HOSPADM

## 2022-11-30 RX ORDER — ONDANSETRON 2 MG/ML
INJECTION INTRAMUSCULAR; INTRAVENOUS PRN
Status: DISCONTINUED | OUTPATIENT
Start: 2022-11-30 | End: 2022-11-30

## 2022-11-30 RX ADMIN — PROPOFOL 200 MG: 10 INJECTION, EMULSION INTRAVENOUS at 11:56

## 2022-11-30 RX ADMIN — PHENYLEPHRINE HYDROCHLORIDE 100 MCG: 10 INJECTION INTRAVENOUS at 12:08

## 2022-11-30 RX ADMIN — SODIUM CHLORIDE, POTASSIUM CHLORIDE, SODIUM LACTATE AND CALCIUM CHLORIDE: 600; 310; 30; 20 INJECTION, SOLUTION INTRAVENOUS at 11:51

## 2022-11-30 RX ADMIN — FENTANYL CITRATE 50 MCG: 50 INJECTION, SOLUTION INTRAMUSCULAR; INTRAVENOUS at 13:11

## 2022-11-30 RX ADMIN — PHENYLEPHRINE HYDROCHLORIDE 100 MCG: 10 INJECTION INTRAVENOUS at 11:57

## 2022-11-30 RX ADMIN — DEXAMETHASONE SODIUM PHOSPHATE 4 MG: 4 INJECTION, SOLUTION INTRA-ARTICULAR; INTRALESIONAL; INTRAMUSCULAR; INTRAVENOUS; SOFT TISSUE at 11:43

## 2022-11-30 RX ADMIN — MIDAZOLAM 2 MG: 1 INJECTION INTRAMUSCULAR; INTRAVENOUS at 11:51

## 2022-11-30 RX ADMIN — ACETAMINOPHEN 975 MG: 325 TABLET, FILM COATED ORAL at 10:29

## 2022-11-30 RX ADMIN — ONDANSETRON 4 MG: 2 INJECTION INTRAMUSCULAR; INTRAVENOUS at 11:43

## 2022-11-30 RX ADMIN — FENTANYL CITRATE 50 MCG: 50 INJECTION, SOLUTION INTRAMUSCULAR; INTRAVENOUS at 12:04

## 2022-11-30 RX ADMIN — PROPOFOL 150 MCG/KG/MIN: 10 INJECTION, EMULSION INTRAVENOUS at 11:56

## 2022-11-30 RX ADMIN — LIDOCAINE HYDROCHLORIDE 100 MG: 20 INJECTION, SOLUTION INFILTRATION; PERINEURAL at 11:56

## 2022-11-30 RX ADMIN — FENTANYL CITRATE 50 MCG: 50 INJECTION, SOLUTION INTRAMUSCULAR; INTRAVENOUS at 11:56

## 2022-11-30 RX ADMIN — OXYCODONE HYDROCHLORIDE 5 MG: 5 TABLET ORAL at 13:29

## 2022-11-30 RX ADMIN — KETOROLAC TROMETHAMINE 30 MG: 30 INJECTION, SOLUTION INTRAMUSCULAR at 12:18

## 2022-11-30 RX ADMIN — PHENYLEPHRINE HYDROCHLORIDE 100 MCG: 10 INJECTION INTRAVENOUS at 12:20

## 2022-11-30 RX ADMIN — PROPOFOL 50 MG: 10 INJECTION, EMULSION INTRAVENOUS at 12:01

## 2022-11-30 ASSESSMENT — ACTIVITIES OF DAILY LIVING (ADL)
ADLS_ACUITY_SCORE: 37
ADLS_ACUITY_SCORE: 37

## 2022-11-30 NOTE — ANESTHESIA CARE TRANSFER NOTE
Patient: Yas Beck    Procedure: Procedure(s):  Diagnostic hysteroscopy novasure endometrial ablation       Diagnosis: Menorrhagia with regular cycle [N92.0]  Diagnosis Additional Information: No value filed.    Anesthesia Type:   General     Note:    Oropharynx: oropharynx clear of all foreign objects  Level of Consciousness: awake  Oxygen Supplementation: room air    Independent Airway: airway patency satisfactory and stable  Dentition: dentition unchanged  Vital Signs Stable: post-procedure vital signs reviewed and stable  Report to RN Given: handoff report given  Patient transferred to: PACU    Handoff Report: Identifed the Patient, Identified the Reponsible Provider, Reviewed the pertinent medical history, Discussed the surgical course, Reviewed Intra-OP anesthesia mangement and issues during anesthesia, Set expectations for post-procedure period and Allowed opportunity for questions and acknowledgement of understanding      Electronically Signed By: LEDA Field CRNA  November 30, 2022  12:54 PM

## 2022-11-30 NOTE — OP NOTE
Procedure Date: 11/30/2022    PREOPERATIVE DIAGNOSIS:  Menorrhagia with regular cycles.    POSTOPERATIVE DIAGNOSIS:  Menorrhagia with regular cycles.    PROCEDURE:  Diagnostic hysteroscopy with NovaSure endometrial ablation.    SURGEON:  Kayla Campos MD    ASSISTANT:  None.    COMPLICATIONS:  None.    ANESTHESIA:  General.    ESTIMATED BLOOD LOSS:  2 mL    FINDINGS:  An anteverted uterus, which sounded to 9.5 cm with a uterine cavity length of 4.5 cm.  Normal endometrial cavity and bilateral tubal ostia appreciated.    INDICATIONS:  Rachel is a 42-year-old woman with longstanding history of a heavy start to her periods.  This was discussed at her yearly exam with me in 04/2022 and workup included ultrasound as well as saline-infused sonohysterogram with me in late September, which revealed a normal uterus with no obvious polyps or fibroids.  Rachel was counseled regarding management options including hormonal medications versus endometrial ablation versus more definitive management with hysterectomy.  Rachel opted for NovaSure ablation, which was discussed in more detail.    DESCRIPTION OF PROCEDURE:  The patient was taken to the operating room where general anesthesia was administered without difficulty.  She was prepared and draped in normal sterile fashion in dorsal lithotomy position.  Timeout was performed to identify correct patient and procedure.  Open-sided speculum was placed into the vagina and anterior lip of the cervix was grasped using a single tooth tenaculum.  The uterus was then gently sounded to 9.5 cm.  The NovaSure SureSound was then used to determine both uterine cavity and cervical length, which revealed a uterine cavity length of 4.5 cm.  The cervix was then serially dilated to 7 mm.  An Omni hysteroscope was introduced without difficulty.  Cavity was once again found to be normal with no evidence of polyps or fibroids.  Of note, Rachel was currently on her period and did have some thickened  endometrium consistent with this timing.  At this time, the NovaSure device was opened.  Cavity length was set to 4.5 cm and the array was opened and found to be functional.  The NovaSure device was then inserted into the uterine fundus and the array was slowly opened without difficulty.  Cavity width was found to be 3.7 cm.  At this time, the cervical collar was placed and cavity assessment was performed and completed.  Ablation took place over the course of 1 minute 26 seconds with a power of 92 pedro.  Following completion of the ablation, the cervical collar was retracted, array was closed and the device removed without issue.  One final look with the hysteroscope was performed with a good global ablation effect noted.  Single-tooth tenaculum was removed and tenaculum sites were treated with silver nitrate.  Bleeding was minimal and procedure was complete.  Rachel was taken to the recovery room in stable condition.    Kayla Campos MD        D: 2022   T: 2022   MT: [id number]    Name:     CHEMA CONTRERAS  MRN:      [id number]        Account:        [id number]   :      1980           Procedure Date: 2022     Document: [id number]    cc:  Kayla Campos MD

## 2022-11-30 NOTE — DISCHARGE INSTRUCTIONS
Today you were given 975 mg of Tylenol at 10:30am. The recommended daily maximum dose is 4000 mg.      Today you received Toradol, an antiinflammatory medication similar to Ibuprofen.  You should not take other antiinflammatory medication, such as Ibuprofen, Motrin, Advil, Aleve, Naprosyn, etc until 6:15pm    Same Day Surgery Discharge Instructions for  Sedation and General Anesthesia     It's not unusual to feel dizzy, light-headed or faint for up to 24 hours after surgery or while taking pain medication.  If you have these symptoms: sit for a few minutes before standing and have someone assist you when you get up to walk or use the bathroom.    You should rest and relax for the next 24 hours. We recommend you make arrangements to have an adult stay with you for at least 24 hours after your discharge.  Avoid hazardous and strenuous activity.    DO NOT DRIVE any vehicle or operate mechanical equipment for 24 hours following the end of your surgery.  Even though you may feel normal, your reactions may be affected by the medication you have received.    Do not drink alcoholic beverages for 24 hours following surgery.     Slowly progress to your regular diet as you feel able. It's not unusual to feel nauseated and/or vomit after receiving anesthesia.  If you develop these symptoms, drink clear liquids (apple juice, ginger ale, broth, 7-up, etc. ) until you feel better.  If your nausea and vomiting persists for 24 hours, please notify your surgeon.      All narcotic pain medications, along with inactivity and anesthesia, can cause constipation. Drinking plenty of liquids and increasing fiber intake will help.    For any questions of a medical nature, call your surgeon.    Do not make important decisions for 24 hours.    If you had general anesthesia, you may have a sore throat for a couple of days related to the breathing tube used during surgery.  You may use Cepacol lozenges to help with this discomfort.  If it  worsens or if you develop a fever, contact your surgeon.     If you feel your pain is not well managed with the pain medications prescribed by your surgeon, please contact your surgeon's office to let them know so they can address your concerns.          **If you have questions or concerns about your procedure,   call Dr. Campos at [phone]**

## 2022-11-30 NOTE — ANESTHESIA PROCEDURE NOTES
"Intrathecal Procedure Note    Pre-Procedure   Staff -        Anesthesiologist:  Franklyn Elias MD       Performed By: anesthesiologist       Location: OR       Pre-Anesthestic Checklist: patient identified, IV checked, risks and benefits discussed, informed consent, monitors and equipment checked and pre-op evaluation  Timeout:       Correct Patient: Yes        Correct Procedure: Yes        Correct Site: Yes        Correct Position: Yes   Procedure Documentation  Procedure: intrathecal       Patient Position: sitting       Patient Prep/Sterile Barriers: sterile gloves, mask, patient draped       Skin prep: Betadine       Insertion Site: L3-4. (midline approach).       Needle Gauge: 24.        Needle Length (Inches): 3.5        Spinal Needle Type: Roshni-Vishnu       Introducer used       # of attempts: 1 and  # of redirects:     Assessment/Narrative         Paresthesias: No.       CSF fluid: clear.    Medication(s) Administered   0.75% Hyperbaric Bupivacaine (Intrathecal) - Intrathecal   1.6 mL - 11/30/2022 10:03:00 AM  Fentanyl PF (Intrathecal) - Intrathecal   20 mcg - 11/30/2022 10:03:00 AM  Morphine PF 1 mg/mL (Intrathecal) - Intrathecal   0.15 mg - 11/30/2022 10:04:00 AM   Comments:  No pain with injection, questions answered about procedure      FOR [facility] ONLY:   Pain Team Contact information: please page the Pain Team Via [employer]. Search \"Pain\". During daytime hours, please page the attending first. At night please page the resident first.    "

## 2022-11-30 NOTE — ANESTHESIA PROCEDURE NOTES
Airway       Patient location during procedure: OR  Staff -        Anesthesiologist:  Franklyn Elias MD       CRNA: Moreno Hale APRN CRNA       Other Anesthesia Staff: Rudy Van       Performed By: SRNA  Consent for Airway        Urgency: elective  Indications and Patient Condition       Indications for airway management: elis-procedural       Induction type:intravenous       Mask difficulty assessment: 0 - not attempted    Final Airway Details       Final airway type: supraglottic airway    Supraglottic Airway Details        Type: LMA       Brand: I-Gel       LMA size: 4    Post intubation assessment        Placement verified by: capnometry, equal breath sounds and chest rise        Number of attempts at approach: 1       Number of other approaches attempted: 0       Secured with: commercial tube swenson       Ease of procedure: easy       Dentition: Intact and Unchanged

## 2022-11-30 NOTE — ANESTHESIA POSTPROCEDURE EVALUATION
Patient: Yas Beck    Procedure: Procedure(s):  Diagnostic hysteroscopy novasure endometrial ablation       Anesthesia Type:  General    Note:  Disposition: Outpatient   Postop Pain Control: Uneventful            Sign Out: Well controlled pain   PONV: No   Neuro/Psych: Uneventful            Sign Out: Acceptable/Baseline neuro status   Airway/Respiratory: Uneventful            Sign Out: Acceptable/Baseline resp. status   CV/Hemodynamics: Uneventful            Sign Out: Acceptable CV status; No obvious hypovolemia; No obvious fluid overload   Other NRE: NONE   DID A NON-ROUTINE EVENT OCCUR? No           Last vitals:  Vitals Value Taken Time   /86 11/30/22 1330   Temp 36.4  C (97.6  F) 11/30/22 1315   Pulse 63 11/30/22 1344   Resp 27 11/30/22 1344   SpO2 97 % 11/30/22 1344   Vitals shown include unvalidated device data.    Electronically Signed By: Franklyn Elias MD  November 30, 2022  2:58 PM

## 2022-11-30 NOTE — ANESTHESIA PREPROCEDURE EVALUATION
Anesthesia Pre-Procedure Evaluation    Patient: Yas Beck   MRN: [id number] : 1980        Procedure : Procedure(s):  Diagnostic hysteroscopy novasure endometrial ablation          Past Medical History:   Diagnosis Date     Abnormal Pap smear of cervix     A couple abnormal PAP's greater than 8 yrs ago, had a colposcopy. [id number] Negative.     Anxiety      HSV-1 infection 2004    Positive HSV-1 test, no cold sore sx's     IUD (intrauterine device) in place 2015    Mirena     Menorrhagia with regular cycle      Migraine     followed by neurology     Subchorionic bleed 2012    on ultrasound at 8 weeks gestation (1.8 cm)      Past Surgical History:   Procedure Laterality Date      SECTION        SECTION  2012    Procedure:  SECTION;  REPEAT  SECTION;  Surgeon: Lalo Lopez MD;  Location:  [facility]     COMBINED  SECTION, SALPINGECTOMY BILATERAL N/A 2017    Procedure: COMBINED  SECTION, SALPINGECTOMY BILATERAL;  REPEAT  SECTION, BILATERAL SALPINGECTOMY ;  Surgeon: Kayla Campos MD;  Location:  [facility]     cool sculpting  2019      No Known Allergies   Social History     Tobacco Use     Smoking status: Never     Smokeless tobacco: Never   Substance Use Topics     Alcohol use: No     Alcohol/week: 0.0 standard drinks      Wt Readings from Last 1 Encounters:   22 73 kg (161 lb)        Anesthesia Evaluation   Pt has had prior anesthetic. Type: General.    No history of anesthetic complications       ROS/MED HX  ENT/Pulmonary:  - neg pulmonary ROS     Neurologic:     (+) migraines,     Cardiovascular:  - neg cardiovascular ROS     METS/Exercise Tolerance: >4 METS    Hematologic:  - neg hematologic  ROS     Musculoskeletal:       GI/Hepatic:  - neg GI/hepatic ROS     Renal/Genitourinary:  - neg Renal ROS     Endo:  - neg endo ROS     Psychiatric/Substance Use:       Infectious Disease:        Malignancy:       Other:            Physical Exam    Airway        Mallampati: II   TM distance: > 3 FB   Neck ROM: full   Mouth opening: > 3 cm    Respiratory Devices and Support         Dental  no notable dental history         Cardiovascular   cardiovascular exam normal          Pulmonary   pulmonary exam normal                OUTSIDE LABS:  CBC:   Lab Results   Component Value Date    WBC 7.2 09/30/2016    HGB 11.7 04/27/2017    HGB 13.5 04/26/2017    HCT 40.8 09/30/2016    HCT 39.9 % 02/01/2012     09/30/2016     02/01/2012     BMP: No results found for: NA, POTASSIUM, CHLORIDE, CO2, BUN, CR, GLC  COAGS: No results found for: PTT, INR, FIBR  POC: No results found for: BGM, HCG, HCGS  HEPATIC: No results found for: ALBUMIN, PROTTOTAL, ALT, AST, GGT, ALKPHOS, BILITOTAL, BILIDIRECT, JOHANA  OTHER: No results found for: PH, LACT, A1C, DEYA, PHOS, MAG, LIPASE, AMYLASE, TSH, T4, T3, CRP, SED    Anesthesia Plan    ASA Status:  2   NPO Status:  NPO Appropriate    Anesthesia Type: General.     - Airway: LMA   Induction: Intravenous.   Maintenance: Balanced.        Consents    Anesthesia Plan(s) and associated risks, benefits, and realistic alternatives discussed. Questions answered and patient/representative(s) expressed understanding.    - Discussed:     - Discussed with:  Patient      - Extended Intubation/Ventilatory Support Discussed: No.      - Patient is DNR/DNI Status: No    Use of blood products discussed: No .     Postoperative Care    Pain management: IV analgesics.   PONV prophylaxis: Ondansetron (or other 5HT-3)     Comments:    Other Comments: Toradol if ok with surgeon            Franklyn Elias MD

## 2022-11-30 NOTE — OR NURSING
Patient brought a picture of home covid antigen test on phone as directed.  I personally saw this result and it was time stamped 11/29/22 9:19pm.  Result was Negative.

## 2022-11-30 NOTE — H&P
This H&P has been reviewed and there are no clinically significant changes in the patient s condition.  The Patient is approved for surgery.    No changes medically from annual exam with me in 4/2022.  Will proceed with hysteroscopy and novasure endometrial ablation for heavy periods.      PE: AF, VSS  Gen: NAD, pleasant, A&Ox3  Cardio: RRR  Chest: CTAB  Abd: soft, nontender  Ext: no edema    Kayla Campos MD

## 2022-11-30 NOTE — BRIEF OP NOTE
[facility]    Brief Operative Note    Pre-operative diagnosis: Menorrhagia with regular cycle [N92.0]  Post-operative diagnosis menorrhagia    Procedure: Procedure(s):  Diagnostic hysteroscopy novasure endometrial ablation  Surgeon: Surgeon(s) and Role:     * Kayla Campos MD - Primary  Anesthesia: General   Estimated Blood Loss: 2mL    Drains: None  Specimens: * No specimens in log *  Findings:   Anterverted uterus which sounded to 9.5cm with 4.5cm cavity.  Bilateral tubal ostia seen.  Complications: None.  Implants: * No implants in log *      Kayla Campos MD

## 2023-03-24 DIAGNOSIS — F41.9 ANXIETY: ICD-10-CM

## 2023-03-24 NOTE — TELEPHONE ENCOUNTER
"Requested Prescriptions   Pending Prescriptions Disp Refills     sertraline (ZOLOFT) 50 MG tablet [Pharmacy Med Name: SERTRALINE HCL 50 MG TABLET] 90 tablet 1     Sig: TAKE 1 TABLET BY MOUTH EVERY DAY       SSRIs Protocol Passed - 3/24/2023 12:55 AM        Passed - Recent (12 mo) or future (30 days) visit within the authorizing provider's specialty     Patient has had an office visit with the authorizing provider or a provider within the authorizing providers department within the previous 12 mos or has a future within next 30 days. See \"Patient Info\" tab in inbasket, or \"Choose Columns\" in Meds & Orders section of the refill encounter.              Passed - Medication is active on med list        Passed - Patient is age 18 or older        Passed - No active pregnancy on record        Passed - No positive pregnancy test in last 12 months           Next 5 appointments (look out 90 days)    Apr 18, 2023  9:30 AM  PHYSICAL with Kayla Campos MD  [facility] ([facility] ) [address] [place] [id number] [phone]        Prescription approved per [facility] Refill Protocol.  Meena Bautista RN on 3/24/2023 at 7:55 AM    "

## 2023-04-14 NOTE — PROGRESS NOTES
"  Yas is a 42 year old  female who presents for annual exam.     Besides routine health maintenance, she has no other health concerns today .    HPI:  Here today for yearly exam --doing well.  Amenorrheic since novasure ablation with me in 2022!!  Very happy with her decision.  +SA --no issues.  Denies bowel/bladder issues.  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    Regular menses? no    Her current contraception method is: tubal ligation.  She  reports that she has never smoked. She has never used smokeless tobacco.    Patient is sexually active.  STD testing offered?  Declined  Last PHQ-9 score on record =       2023     9:56 AM   PHQ-9 SCORE   PHQ-9 Total Score 0     Last GAD7 score on record =       2023     9:56 AM   SANTIAGO-7 SCORE   Total Score 2     Alcohol Score = 4    HEALTH MAINTENANCE:  Cholesterol: (No results found for: CHOL   Last Mammo: Not applicable, Result: Not applicable, Next Mammo: Today   Pap:   Lab Results   Component Value Date    PAP NIL 2019    PAP NIL 10/11/2012   1/11/19 WNL HPV (-)neg  Colonoscopy:  NA, Result: Not applicable, Next Colonoscopy: NA years.  Dexa:  NA    Health maintenance updated:  yes    HISTORY:  OB History    Para Term  AB Living   4 3 3 0 1 3   SAB IAB Ectopic Multiple Live Births   1 0 0 0 3      # Outcome Date " GA Lbr Daniel/2nd Weight Sex Delivery Anes PTL Lv   4 Term 17 39w3d  3.68 kg (8 lb 1.8 oz) F CS-LTranv Spinal  ABDIRIZAK      Name: Carla      Apgar1: 9  Apgar5: 9   3 SAB 10/2014 7w0d          2 Term 12 39w0d  4.394 kg (9 lb 11 oz) F CS-LTranv Spinal  ABDIRIZAK      Name: Erna      Apgar1: 8  Apgar5: 9   1 Term 07/10/10 41w0d  3.771 kg (8 lb 5 oz) M -SEC   ABDIRIZAK      Birth Comments: Post-date induction --Failure to progress.      Name: Mian       Patient Active Problem List   Diagnosis     HSV-1 infection     Migraine     Menorrhagia with regular cycle     Past Surgical History:   Procedure Laterality Date      SECTION        SECTION  2012    Procedure:  SECTION;  REPEAT  SECTION;  Surgeon: Lalo Lopez MD;  Location:  L+D     COMBINED  SECTION, SALPINGECTOMY BILATERAL N/A 2017    Procedure: COMBINED  SECTION, SALPINGECTOMY BILATERAL;  REPEAT  SECTION, BILATERAL SALPINGECTOMY ;  Surgeon: Kayla Campos MD;  Location:  L+     cool sculpting  2019     DILATE CERVIX, HYSTEROSCOPY, ABLATE ENDOMETRIUM NOVASURE, COMBINED N/A 2022    Procedure: Diagnostic hysteroscopy novasure endometrial ablation;  Surgeon: Kayla Campos MD;  Location:  OR      Social History     Tobacco Use     Smoking status: Never     Smokeless tobacco: Never   Vaping Use     Vaping status: Never Used   Substance Use Topics     Alcohol use: No     Comment: Couple times per week, glasses of wine      Problem (# of Occurrences) Relation (Name,Age of Onset)    Cancer (1) Father: Father had multiple mylema and passed away 2016.    Hyperlipidemia (2) Mother, Sister    Heart Surgery (1) Daughter (1): 2018-had a AFT (extra valve that didnt close after birth)    No Known Problems (6) Brother, Maternal Grandmother, Maternal Grandfather, Paternal Grandmother, Paternal Grandfather, Other            Current Outpatient Medications   Medication  "Sig     acetaminophen (TYLENOL) 325 MG tablet Take 3 tablets (975 mg) by mouth every 6 hours as needed for mild pain     ibuprofen (ADVIL/MOTRIN) 800 MG tablet Take 1 tablet (800 mg) by mouth every 6 hours as needed for other (mild and/or inflammatory pain)     MAGNESIUM OXIDE PO      Multiple Vitamins-Minerals (WOMENS MULTI VITAMIN & MINERAL PO)      sertraline (ZOLOFT) 50 MG tablet Take 1 tablet (50 mg) by mouth daily     No current facility-administered medications for this visit.     No Known Allergies    Past medical, surgical, social and family histories were reviewed and updated in EPIC.    ROS:   12 point review of systems negative other than symptoms noted below or in the HPI.  No urinary frequency or dysuria, bladder or kidney problems    EXAM:  /72   Ht 1.702 m (5' 7\")   Wt 76.2 kg (168 lb)   LMP 11/01/2022 (Approximate)   BMI 26.31 kg/m     BMI: Body mass index is 26.31 kg/m .    PHYSICAL EXAM:  Constitutional:   Appearance: Well nourished, well developed, alert, in no acute distress  Neck:  Lymph Nodes:  No lymphadenopathy present    Thyroid:  Gland size normal, nontender, no nodules or masses present  on palpation  Chest:  Respiratory Effort:  Breathing unlabored  Cardiovascular:    Heart: Auscultation:  Regular rate, normal rhythm, no murmurs present  Breasts: Palpation of Breasts and Axillae:  No masses present on palpation, no breast tenderness., Axillary Lymph Nodes:  No lymphadenopathy present. and No nodularity, asymmetry or nipple discharge bilaterally.  Gastrointestinal:   Abdominal Examination:  Abdomen nontender to palpation, tone normal without rigidity or guarding, no masses present, umbilicus without lesions   Liver and Spleen:  No hepatomegaly present, liver nontender to palpation    Hernias:  No hernias present  Lymphatic: Lymph Nodes:  No other lymphadenopathy present  Skin:  General Inspection:  No rashes present, no lesions present, no areas of  discoloration  Neurologic:    " Mental Status:  Oriented X3.  Normal strength and tone, sensory exam                grossly normal, mentation intact and speech normal.    Psychiatric:   Mentation appears normal and affect normal/bright.         Pelvic Exam:  External Genitalia:     Normal appearance for age, no discharge present, no tenderness present, no inflammatory lesions present, color normal  Vagina:     Normal vaginal vault without central or paravaginal defects, no discharge present, no inflammatory lesions present, no masses present  Bladder:     Nontender to palpation  Urethra:   Urethral Body:  Urethra palpation normal, urethra structural support normal   Urethral Meatus:  No erythema or lesions present  Cervix:     Appearance healthy, no lesions present, nontender to palpation, no bleeding present  Uterus:     Uterus: firm, normal sized and nontender, anteverted in position.   Adnexa:     No adnexal tenderness present, no adnexal masses present  Perineum:     Perineum within normal limits, no evidence of trauma, no rashes or skin lesions present  Anus:     Anus within normal limits, no hemorrhoids present  Inguinal Lymph Nodes:     No lymphadenopathy present  Pubic Hair:     Normal pubic hair distribution for age  Genitalia and Groin:     No rashes present, no lesions present, no areas of discoloration, no masses present      COUNSELING:   Reviewed preventive health counseling, as reflected in patient instructions  Special attention given to:        Regular exercise       Healthy diet/nutrition    BMI: Body mass index is 26.31 kg/m .  Weight management plan: Discussed healthy diet and exercise guidelines    ASSESSMENT:  42 year old female with satisfactory annual exam.    ICD-10-CM    1. Encounter for gynecological examination without abnormal finding  Z01.419       2. Anxiety  F41.9 sertraline (ZOLOFT) 50 MG tablet      3. Encounter for lipid screening for cardiovascular disease  Z13.220 Lipid panel reflex to direct LDL Fasting     Z13.6       4. Screening for diabetes mellitus  Z13.1 Glucose, whole blood      5. Screening for thyroid disorder  Z13.29 TSH with free T4 reflex          PLAN:  Patient Instructions   Return to clinic for screening Lipids, thyroid and Fasting Blood sugar.  Follow up with your primary care provider for your other medical problems.  Continue self breast exam.  Increase physical activity and exercise.  Lab results will be called to the patient.  Usual safety and preventative measures counseling done.  BMI >25  Weight loss encouraged.  Last pap smear (2019) was normal and negative for the DNA of high risk HPV subtypes.  No pap was obtained this year.  This was discussed with the patient and she agrees with the plan.       Kayla Campos MD

## 2023-04-18 ENCOUNTER — OFFICE VISIT (OUTPATIENT)
Dept: OBGYN | Facility: CLINIC | Age: 43
End: 2023-04-18
Payer: COMMERCIAL

## 2023-04-18 ENCOUNTER — ANCILLARY PROCEDURE (OUTPATIENT)
Dept: MAMMOGRAPHY | Facility: CLINIC | Age: 43
End: 2023-04-18
Payer: COMMERCIAL

## 2023-04-18 VITALS
DIASTOLIC BLOOD PRESSURE: 72 MMHG | SYSTOLIC BLOOD PRESSURE: 110 MMHG | HEIGHT: 67 IN | WEIGHT: 168 LBS | BODY MASS INDEX: 26.37 KG/M2

## 2023-04-18 DIAGNOSIS — Z13.29 SCREENING FOR THYROID DISORDER: ICD-10-CM

## 2023-04-18 DIAGNOSIS — Z13.220 ENCOUNTER FOR LIPID SCREENING FOR CARDIOVASCULAR DISEASE: ICD-10-CM

## 2023-04-18 DIAGNOSIS — Z01.419 ENCOUNTER FOR GYNECOLOGICAL EXAMINATION WITHOUT ABNORMAL FINDING: Primary | ICD-10-CM

## 2023-04-18 DIAGNOSIS — Z12.31 VISIT FOR SCREENING MAMMOGRAM: ICD-10-CM

## 2023-04-18 DIAGNOSIS — Z13.6 ENCOUNTER FOR LIPID SCREENING FOR CARDIOVASCULAR DISEASE: ICD-10-CM

## 2023-04-18 DIAGNOSIS — F41.9 ANXIETY: ICD-10-CM

## 2023-04-18 DIAGNOSIS — Z13.1 SCREENING FOR DIABETES MELLITUS: ICD-10-CM

## 2023-04-18 PROCEDURE — 77067 SCR MAMMO BI INCL CAD: CPT | Mod: TC | Performed by: RADIOLOGY

## 2023-04-18 PROCEDURE — 99396 PREV VISIT EST AGE 40-64: CPT | Performed by: OBSTETRICS & GYNECOLOGY

## 2023-04-18 PROCEDURE — 77063 BREAST TOMOSYNTHESIS BI: CPT | Mod: TC | Performed by: RADIOLOGY

## 2023-04-18 ASSESSMENT — ANXIETY QUESTIONNAIRES
GAD7 TOTAL SCORE: 2
2. NOT BEING ABLE TO STOP OR CONTROL WORRYING: NOT AT ALL
5. BEING SO RESTLESS THAT IT IS HARD TO SIT STILL: NOT AT ALL
GAD7 TOTAL SCORE: 2
IF YOU CHECKED OFF ANY PROBLEMS ON THIS QUESTIONNAIRE, HOW DIFFICULT HAVE THESE PROBLEMS MADE IT FOR YOU TO DO YOUR WORK, TAKE CARE OF THINGS AT HOME, OR GET ALONG WITH OTHER PEOPLE: NOT DIFFICULT AT ALL
3. WORRYING TOO MUCH ABOUT DIFFERENT THINGS: NOT AT ALL
6. BECOMING EASILY ANNOYED OR IRRITABLE: NOT AT ALL
7. FEELING AFRAID AS IF SOMETHING AWFUL MIGHT HAPPEN: NOT AT ALL
1. FEELING NERVOUS, ANXIOUS, OR ON EDGE: SEVERAL DAYS

## 2023-04-18 ASSESSMENT — PATIENT HEALTH QUESTIONNAIRE - PHQ9
5. POOR APPETITE OR OVEREATING: SEVERAL DAYS
SUM OF ALL RESPONSES TO PHQ QUESTIONS 1-9: 0

## 2023-04-18 NOTE — PATIENT INSTRUCTIONS
Return to clinic for screening Lipids, thyroid and Fasting Blood sugar.  Follow up with your primary care provider for your other medical problems.  Continue self breast exam.  Increase physical activity and exercise.  Lab results will be called to the patient.  Usual safety and preventative measures counseling done.  BMI >25  Weight loss encouraged.  Last pap smear (2019) was normal and negative for the DNA of high risk HPV subtypes.  No pap was obtained this year.  This was discussed with the patient and she agrees with the plan.

## 2023-05-01 ENCOUNTER — LAB (OUTPATIENT)
Dept: LAB | Facility: CLINIC | Age: 43
End: 2023-05-01
Payer: COMMERCIAL

## 2023-05-01 DIAGNOSIS — Z13.220 ENCOUNTER FOR LIPID SCREENING FOR CARDIOVASCULAR DISEASE: ICD-10-CM

## 2023-05-01 DIAGNOSIS — Z13.29 SCREENING FOR THYROID DISORDER: ICD-10-CM

## 2023-05-01 DIAGNOSIS — Z13.1 SCREENING FOR DIABETES MELLITUS: ICD-10-CM

## 2023-05-01 DIAGNOSIS — Z13.6 ENCOUNTER FOR LIPID SCREENING FOR CARDIOVASCULAR DISEASE: ICD-10-CM

## 2023-05-01 LAB
CHOLEST SERPL-MCNC: 234 MG/DL
GLUCOSE BLD-MCNC: 92 MG/DL (ref 60–99)
HDLC SERPL-MCNC: 43 MG/DL
LDLC SERPL CALC-MCNC: 165 MG/DL
NONHDLC SERPL-MCNC: 191 MG/DL
TRIGL SERPL-MCNC: 128 MG/DL
TSH SERPL DL<=0.005 MIU/L-ACNC: 1.37 UIU/ML (ref 0.3–4.2)

## 2023-05-01 PROCEDURE — 84443 ASSAY THYROID STIM HORMONE: CPT

## 2023-05-01 PROCEDURE — 80061 LIPID PANEL: CPT

## 2023-05-01 PROCEDURE — 82947 ASSAY GLUCOSE BLOOD QUANT: CPT

## 2023-05-01 PROCEDURE — 36415 COLL VENOUS BLD VENIPUNCTURE: CPT

## 2023-05-01 NOTE — LETTER
[facility]  [address] , [address]  [place]   [id number] [phone]        5/2/2023     Yas Beck  [address] [address] [id number]    Your fasting blood sugar and thyroid normal but your cholesterol is quite elevated.  Both total and LDL (bad) cholesterol are well above our goals of 200 and 130 respectively. Dr. Campos recommends to work hard on a healthy diet and to exercise this summer to help with these levels.  Limit trans and saturated fats, increase healthy fats including fish or fish oil; increase fruits, vegetables and fiber; limit your sweets and carb intake.  Be sure to get adequate exercise--ideally 150min per week of moderate intensity exercise.  After working on these things, she would like you to repeat your cholesterol testing in 3 months.  If it is still elevated, she would recommend you establish care with an internal medicine or family practice provider to help manage.    Also, the American Heart Association does have information regarding diet and exercise as well.        Results for orders placed or performed in visit on 05/01/23   Lipid panel reflex to direct LDL Fasting     Status: Abnormal   Result Value Ref Range    Cholesterol 234 (H) <200 mg/dL    Triglycerides 128 <150 mg/dL    Direct Measure HDL 43 (L) >=50 mg/dL    LDL Cholesterol Calculated 165 (H) <=100 mg/dL    Non HDL Cholesterol 191 (H) <130 mg/dL    Narrative    Cholesterol  Desirable:  <200 mg/dL    Triglycerides  Normal:  Less than 150 mg/dL  Borderline High:  150-199 mg/dL  High:  200-499 mg/dL  Very High:  Greater than or equal to 500 mg/dL    Direct Measure HDL  Female:  Greater than or equal to 50 mg/dL   Male:  Greater than or equal to 40 mg/dL    LDL Cholesterol  Desirable:  <100mg/dL  Above Desirable:  100-129 mg/dL   Borderline High:  130-159 mg/dL   High:  160-189 mg/dL   Very High:  >= 190 mg/dL    Non HDL Cholesterol  Desirable:  130 mg/dL  Above Desirable:   130-159 mg/dL  Borderline High:  160-189 mg/dL  High:  190-219 mg/dL  Very High:  Greater than or equal to 220 mg/dL   Glucose, whole blood     Status: Normal   Result Value Ref Range    Glucose Whole Blood 92 60 - 99 mg/dL   TSH with free T4 reflex     Status: Normal   Result Value Ref Range    TSH 1.37 0.30 - 4.20 uIU/mL       Please call with any questions.    Meena Quiñones RN

## 2023-05-02 ENCOUNTER — TELEPHONE (OUTPATIENT)
Dept: OBGYN | Facility: CLINIC | Age: 43
End: 2023-05-02
Payer: COMMERCIAL

## 2023-05-02 NOTE — RESULT ENCOUNTER NOTE
Please inform of results;  fasting blood sugar and thyroid normal but cholesterol is quite elevated.  Both total and LDL cholesterol are well above our goals of 200 and 130 respectively.  I will encourage her to work hard on healthy diet and exercise this summer to help with these levels.  Limit trans and saturated fats, increase healthy fats including fish or fish oil; increase fruits, vegetables and fiber; limit her sweets/carb intake.  Be sure to get adequate exercise--ideally we are getting 150min per week of moderate intensity exercise.  After working on these things, I would like her to repeat her cholesterol testing in 3 months.  If still elevated, I will  have her establish with an internal medicine or family practice provider to help manage

## 2023-05-03 ENCOUNTER — TELEPHONE (OUTPATIENT)
Dept: OBGYN | Facility: CLINIC | Age: 43
End: 2023-05-03
Payer: COMMERCIAL

## 2023-05-03 DIAGNOSIS — Z13.6 ENCOUNTER FOR LIPID SCREENING FOR CARDIOVASCULAR DISEASE: Primary | ICD-10-CM

## 2023-05-03 DIAGNOSIS — Z13.220 ENCOUNTER FOR LIPID SCREENING FOR CARDIOVASCULAR DISEASE: Primary | ICD-10-CM

## 2023-11-06 ENCOUNTER — TELEPHONE (OUTPATIENT)
Dept: OBGYN | Facility: CLINIC | Age: 43
End: 2023-11-06
Payer: COMMERCIAL

## 2023-11-06 NOTE — TELEPHONE ENCOUNTER
Reason for call:  Other   Patient called regarding (reason for call): call back  Additional comments: was brought to ER by ambulance, fainted x 3 at a restaurant  Wants to discuss    Phone number to reach patient:  Cell number on file:    Telephone Information:   Mobile [phone]       Best Time:  any    Can we leave a detailed message on this number?  YES    Travel screening: Not Applicable

## 2023-11-06 NOTE — TELEPHONE ENCOUNTER
Returned pt call  She has been seen at [facility] ER   Semiglutide shots - 1st was 10/25 and 2nd 11/1 - reducing her appetite but eating and did have alcoholic beverages as well.    Saturday: Dinner that evening and alcoholic beverages and fainted in the lobby and again at the table. The restaurant staff called 911 and taken to ER. Heart monitors. ER for 4 hours.    [facility] is the prescribing clinic for Semiglutide and she has not notified the prescribing provider yet, uses for weight loss. Reviewed w pt that we are not the prescribing provider nor do we manage this type of drug. Is off label use for weight loss. Would recommend a f/up w a PCP and referred her to [facility] [place] [facility]. Instructed her to notify the prescribing clinic as well. Could be the new drug and the mixture of alcohol and possible dehydration which is a common side effect.    Pt verbalized understanding, in agreement with plan, and voiced no further questions.    Jeanine Barros RN on 11/6/2023 at 9:35 AM

## 2023-11-08 ENCOUNTER — LAB (OUTPATIENT)
Dept: LAB | Facility: CLINIC | Age: 43
End: 2023-11-08
Payer: COMMERCIAL

## 2023-11-08 DIAGNOSIS — Z13.6 ENCOUNTER FOR LIPID SCREENING FOR CARDIOVASCULAR DISEASE: ICD-10-CM

## 2023-11-08 DIAGNOSIS — Z13.220 ENCOUNTER FOR LIPID SCREENING FOR CARDIOVASCULAR DISEASE: ICD-10-CM

## 2023-11-08 LAB
CHOLEST SERPL-MCNC: 209 MG/DL
HDLC SERPL-MCNC: 49 MG/DL
LDLC SERPL CALC-MCNC: 148 MG/DL
NONHDLC SERPL-MCNC: 160 MG/DL
TRIGL SERPL-MCNC: 60 MG/DL

## 2023-11-08 PROCEDURE — 80061 LIPID PANEL: CPT

## 2023-11-08 PROCEDURE — 36415 COLL VENOUS BLD VENIPUNCTURE: CPT

## 2023-11-09 ENCOUNTER — TELEPHONE (OUTPATIENT)
Dept: OBGYN | Facility: CLINIC | Age: 43
End: 2023-11-09
Payer: COMMERCIAL

## 2023-11-09 NOTE — RESULT ENCOUNTER NOTE
Please inform of results; lipids better but still elevated in total and LDL cholesterol.  Her triglycerides are MUCH improved.  Our goals for total cholesterol are less than 200 and for LDL are less than 130.  This isn't something that we need to treat at this point because she doesn't have other risk factors for heart disease or stroke but definitely something to continue working on.  We'll repeat these labs at her yearly exam --if still elevated at that time, I'll have her see either internal medicine or cardiology to discuss

## 2024-05-08 DIAGNOSIS — F41.9 ANXIETY: ICD-10-CM

## 2024-05-08 NOTE — TELEPHONE ENCOUNTER
Requested Prescriptions   Pending Prescriptions Disp Refills    sertraline (ZOLOFT) 50 MG tablet [Pharmacy Med Name: SERTRALINE HCL 50 MG TABLET] 90 tablet 3     Sig: TAKE 1 TABLET BY MOUTH EVERY DAY       SSRIs Protocol Failed - 5/8/2024 12:53 AM        Failed - Recent (12 mo) or future (30 days) visit within the authorizing provider's specialty     The patient must have completed an in-person or virtual visit within the past 12 months or has a future visit scheduled within the next 90 days with the authorizing provider s specialty.  Urgent care and e-visits do not quality as an office visit for this protocol.          Passed - Medication is active on med list        Passed - Patient is age 18 or older        Passed - No active pregnancy on record        Passed - No positive pregnancy test in last 12 months           Last Written Prescription Date:  4/18/23  Last Fill Quantity: 90,  # refills: 3   Last office visit: 4/18/2023 ; last virtual visit: Visit date not found with prescribing provider:  Andres   Future Office Visit:  5/29/24 with Dr. Campos    Medication is being filled for 1 time refill only due to:  Patient needs to be seen because it has been more than one year since last visit.  Appointment scheduled  Adela Dickens RN on 5/8/2024 at 6:02 AM

## 2024-05-29 ENCOUNTER — OFFICE VISIT (OUTPATIENT)
Dept: OBGYN | Facility: CLINIC | Age: 44
End: 2024-05-29
Payer: COMMERCIAL

## 2024-05-29 ENCOUNTER — ANCILLARY PROCEDURE (OUTPATIENT)
Dept: MAMMOGRAPHY | Facility: CLINIC | Age: 44
End: 2024-05-29
Payer: COMMERCIAL

## 2024-05-29 VITALS
SYSTOLIC BLOOD PRESSURE: 120 MMHG | BODY MASS INDEX: 22.76 KG/M2 | WEIGHT: 145 LBS | HEIGHT: 67 IN | DIASTOLIC BLOOD PRESSURE: 78 MMHG

## 2024-05-29 DIAGNOSIS — Z12.31 VISIT FOR SCREENING MAMMOGRAM: ICD-10-CM

## 2024-05-29 DIAGNOSIS — Z01.419 ENCOUNTER FOR GYNECOLOGICAL EXAMINATION WITHOUT ABNORMAL FINDING: Primary | ICD-10-CM

## 2024-05-29 DIAGNOSIS — F41.9 ANXIETY: ICD-10-CM

## 2024-05-29 PROCEDURE — 77067 SCR MAMMO BI INCL CAD: CPT | Mod: TC | Performed by: RADIOLOGY

## 2024-05-29 PROCEDURE — 99396 PREV VISIT EST AGE 40-64: CPT | Performed by: OBSTETRICS & GYNECOLOGY

## 2024-05-29 PROCEDURE — 87624 HPV HI-RISK TYP POOLED RSLT: CPT | Performed by: OBSTETRICS & GYNECOLOGY

## 2024-05-29 PROCEDURE — 77063 BREAST TOMOSYNTHESIS BI: CPT | Mod: TC | Performed by: RADIOLOGY

## 2024-05-29 PROCEDURE — G0145 SCR C/V CYTO,THINLAYER,RESCR: HCPCS | Performed by: OBSTETRICS & GYNECOLOGY

## 2024-05-29 RX ORDER — CETIRIZINE HYDROCHLORIDE 5 MG/1
5 TABLET ORAL DAILY
COMMUNITY

## 2024-05-29 ASSESSMENT — ANXIETY QUESTIONNAIRES
3. WORRYING TOO MUCH ABOUT DIFFERENT THINGS: NOT AT ALL
1. FEELING NERVOUS, ANXIOUS, OR ON EDGE: NOT AT ALL
GAD7 TOTAL SCORE: 0
6. BECOMING EASILY ANNOYED OR IRRITABLE: NOT AT ALL
2. NOT BEING ABLE TO STOP OR CONTROL WORRYING: NOT AT ALL
GAD7 TOTAL SCORE: 0
7. FEELING AFRAID AS IF SOMETHING AWFUL MIGHT HAPPEN: NOT AT ALL
5. BEING SO RESTLESS THAT IT IS HARD TO SIT STILL: NOT AT ALL
IF YOU CHECKED OFF ANY PROBLEMS ON THIS QUESTIONNAIRE, HOW DIFFICULT HAVE THESE PROBLEMS MADE IT FOR YOU TO DO YOUR WORK, TAKE CARE OF THINGS AT HOME, OR GET ALONG WITH OTHER PEOPLE: NOT DIFFICULT AT ALL

## 2024-05-29 ASSESSMENT — PATIENT HEALTH QUESTIONNAIRE - PHQ9
SUM OF ALL RESPONSES TO PHQ QUESTIONS 1-9: 0
5. POOR APPETITE OR OVEREATING: NOT AT ALL

## 2024-05-29 NOTE — PROGRESS NOTES
Yas is a 43 year old  female who presents for annual exam.     Besides routine health maintenance, she has no other health concerns today .    HPI:  Here today for yearly exam --doing well.  Amenorrheic since ablation with me in .  No vb/spotting.  Denies hot flushes or night sweats.  +SA --no issues.  Denies bowel/bladder issues.  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 Beh  -on [place] through [facility] and feeling great; down 20+ lbs since starting last year      GYNECOLOGIC HISTORY:    No LMP recorded. Patient has had an ablation.      Her current contraception method is: tubal ligation.  She  reports that she has never smoked. She has never used smokeless tobacco.    Patient is sexually active.  STD testing offered?  Declined  Last PHQ-9 score on record =       2024     9:32 AM   PHQ-9 SCORE   PHQ-9 Total Score 0     Last GAD7 score on record =       2024     9:32 AM   SANTIAGO-7 SCORE   Total Score 0     Alcohol Score = 3    HEALTH MAINTENANCE:  Cholesterol:   Cholesterol   Date Value Ref Range Status   2023 209 (H) <200 mg/dL Final   2023 234 (H) <200 mg/dL Final   Last Mammo: One year ago, Result: Normal, Next Mammo: Today   Pap:   Lab Results   Component Value Date    PAP NIL 2019    PAP NIL 10/11/2012   2019 WNL PHV (-)neg  Colonoscopy:  NA, Result: NA, Next Colonoscopy: age 47 years.  Dexa:  NA    Health maintenance updated:  yes    HISTORY:  OB History    Para Term  AB Living   4 3 3 0 1 3   SAB IAB Ectopic Multiple Live Births   1  0 0 0 3      # Outcome Date GA Lbr Daniel/2nd Weight Sex Type Anes PTL Lv   4 Term 17 39w3d  3.68 kg (8 lb 1.8 oz) F CS-LTranv Spinal  ABDIRIZAK      Name: Carla      Apgar1: 9  Apgar5: 9   3 SAB 10/2014 7w0d          2 Term 12 39w0d  4.394 kg (9 lb 11 oz) F CS-LTranv Spinal  ABDIRIZAK      Name: Erna      Apgar1: 8  Apgar5: 9   1 Term 07/10/10 41w0d  3.771 kg (8 lb 5 oz) M -SEC   ABDIRIZAK      Birth Comments: Post-date induction --Failure to progress.      Name: Mian       Patient Active Problem List   Diagnosis    HSV-1 infection    Migraine    Menorrhagia with regular cycle    Anxiety     Past Surgical History:   Procedure Laterality Date     SECTION       SECTION  2012    Procedure:  SECTION;  REPEAT  SECTION;  Surgeon: Lalo Lopez MD;  Location:  L+D    COMBINED  SECTION, SALPINGECTOMY BILATERAL N/A 2017    Procedure: COMBINED  SECTION, SALPINGECTOMY BILATERAL;  REPEAT  SECTION, BILATERAL SALPINGECTOMY ;  Surgeon: Kayla Campos MD;  Location:  L+    cool sculpting  2019    DILATE CERVIX, HYSTEROSCOPY, ABLATE ENDOMETRIUM NOVASURE, COMBINED N/A 2022    Procedure: Diagnostic hysteroscopy novasure endometrial ablation;  Surgeon: Kayla Campos MD;  Location:  OR      Social History     Tobacco Use    Smoking status: Never    Smokeless tobacco: Never   Substance Use Topics    Alcohol use: No     Comment: Couple times per week, glasses of wine      Problem (# of Occurrences) Relation (Name,Age of Onset)    Cancer (1) Father: Father had multiple mylema and passed away 2016.    Hyperlipidemia (2) Mother, Sister    Heart Surgery (1) Daughter (1): 2018-had a AFT (extra valve that didnt close after birth)    No Known Problems (6) Brother, Maternal Grandmother, Maternal Grandfather, Paternal Grandmother, Paternal Grandfather, Other              Current Outpatient Medications   Medication Sig Dispense Refill  "   acetaminophen (TYLENOL) 325 MG tablet Take 3 tablets (975 mg) by mouth every 6 hours as needed for mild pain 50 tablet 0    cetirizine (ZYRTEC) 5 MG tablet Take 5 mg by mouth daily      ibuprofen (ADVIL/MOTRIN) 800 MG tablet Take 1 tablet (800 mg) by mouth every 6 hours as needed for other (mild and/or inflammatory pain) 30 tablet 0    MAGNESIUM OXIDE PO       Multiple Vitamins-Minerals (WOMENS MULTI VITAMIN & MINERAL PO)       Semaglutide-Weight Management (WEGOVY) 0.5 MG/0.5ML pen Inject 0.5 mg Subcutaneous once a week      sertraline (ZOLOFT) 50 MG tablet Take 1 tablet (50 mg) by mouth daily 90 tablet 3     No current facility-administered medications for this visit.     No Known Allergies    Past medical, surgical, social and family histories were reviewed and updated in EPIC.    ROS:   12 point review of systems negative other than symptoms noted below or in the HPI.  No urinary frequency or dysuria, bladder or kidney problems    EXAM:  /78   Ht 1.708 m (5' 7.25\")   Wt 65.8 kg (145 lb)   BMI 22.54 kg/m     BMI: Body mass index is 22.54 kg/m .    PHYSICAL EXAM:  Constitutional:   Appearance: Well nourished, well developed, alert, in no acute distress  Neck:  Lymph Nodes:  No lymphadenopathy present    Thyroid:  Gland size normal, nontender, no nodules or masses present  on palpation  Chest:  Respiratory Effort:  Breathing unlabored  Cardiovascular:    Heart: Auscultation:  Regular rate, normal rhythm, no murmurs present  Breasts: Palpation of Breasts and Axillae:  No masses present on palpation, no breast tenderness., Axillary Lymph Nodes:  No lymphadenopathy present., No nodularity, asymmetry or nipple discharge bilaterally., and +FIBROCYSTIC BREASTS BILATERALLY  Gastrointestinal:   Abdominal Examination:  Abdomen nontender to palpation, tone normal without rigidity or guarding, no masses present, umbilicus without lesions   Liver and Spleen:  No hepatomegaly present, liver nontender to " palpation    Hernias:  No hernias present  Lymphatic: Lymph Nodes:  No other lymphadenopathy present  Skin:  General Inspection:  No rashes present, no lesions present, no areas of  discoloration  Neurologic:    Mental Status:  Oriented X3.  Normal strength and tone, sensory exam                grossly normal, mentation intact and speech normal.    Psychiatric:   Mentation appears normal and affect normal/bright.         Pelvic Exam:  External Genitalia:     Normal appearance for age, no discharge present, no tenderness present, no inflammatory lesions present, color normal  Vagina:     Normal vaginal vault without central or paravaginal defects, no discharge present, no inflammatory lesions present, no masses present  Bladder:     Nontender to palpation  Urethra:   Urethral Body:  Urethra palpation normal, urethra structural support normal   Urethral Meatus:  No erythema or lesions present  Cervix:     Appearance healthy, no lesions present, nontender to palpation, no bleeding present  Uterus:     Uterus: firm, normal sized and nontender, midplane in position.   Adnexa:     No adnexal tenderness present, no adnexal masses present  Perineum:     Perineum within normal limits, no evidence of trauma, no rashes or skin lesions present  Anus:     Anus within normal limits, no hemorrhoids present  Inguinal Lymph Nodes:     No lymphadenopathy present  Pubic Hair:     Normal pubic hair distribution for age  Genitalia and Groin:     No rashes present, no lesions present, no areas of discoloration, no masses present    COUNSELING:   Reviewed preventive health counseling, as reflected in patient instructions  Special attention given to:        Regular exercise       Healthy diet/nutrition    BMI: Body mass index is 22.54 kg/m .      ASSESSMENT:  43 year old female with satisfactory annual exam.    ICD-10-CM    1. Encounter for gynecological examination without abnormal finding  Z01.419 Pap Thin Layer Screen with HPV -  Recommended Age 30 - 65 Years      2. Anxiety  F41.9 sertraline (ZOLOFT) 50 MG tablet          PLAN:  Patient Instructions   Follow up with your primary care provider for your other medical problems.  Will follow up previously elevated cholesterol with her newly established primary care provider.  Continue self breast exam.  Increase physical activity and exercise.  Lab and pap smear results will be called to the patient.  Co-testing done today and will repeat in 5 years if normal.  Usual safety and preventative measures counseling done.       Kayla Campos MD

## 2024-05-29 NOTE — PATIENT INSTRUCTIONS
Follow up with your primary care provider for your other medical problems.  Will follow up previously elevated cholesterol with her newly established primary care provider.  Continue self breast exam.  Increase physical activity and exercise.  Lab and pap smear results will be called to the patient.  Co-testing done today and will repeat in 5 years if normal.  Usual safety and preventative measures counseling done.

## 2024-05-30 LAB
HPV HR 12 DNA CVX QL NAA+PROBE: NEGATIVE
HPV16 DNA CVX QL NAA+PROBE: NEGATIVE
HPV18 DNA CVX QL NAA+PROBE: NEGATIVE
HUMAN PAPILLOMA VIRUS FINAL DIAGNOSIS: NORMAL

## 2024-06-03 LAB
BKR LAB AP GYN ADEQUACY: NORMAL
BKR LAB AP GYN INTERPRETATION: NORMAL
BKR LAB AP PREVIOUS ABNORMAL: NORMAL
PATH REPORT.COMMENTS IMP SPEC: NORMAL
PATH REPORT.COMMENTS IMP SPEC: NORMAL
PATH REPORT.RELEVANT HX SPEC: NORMAL

## 2025-06-12 NOTE — PROGRESS NOTES
Yas is a 44 year old  female who presents for annual exam.     Besides routine health maintenance, she has no other health concerns today .    HPI:  Here today for yearly exam --doing well.  Amenorrheic since her ablation with me in .  No vb/spotting.  +occ night sweats this year.  Not many daytime symptoms.  Moods fairly stable on her zoloft.  +SA --has noticed less libido but has also been very very busy with kids, life, etc.  No dryness or pain with sex.  Denies bowel/bladder issues.  Up rarely overnight.  No leaking unless on the trampoline    ; homemaker; kids are doing well --13yo, 13yo and 9yo; girls active in dance and her son in hockey; will spend month in [place] at nearly finished 2nd home!!  -staying active; working out regularily and really feeling well  PCP -Dr. Beh; follows labs, meds, etc  -wondering about colonoscopy (no family hx colon cancer or polyps) and dexa scans  -hx anxiety --doing well on zoloft and would like to continue; really feels that it helps keep everything in manageable zone with kids, [place] house build, etc.      GYNECOLOGIC HISTORY:    No LMP recorded. Patient has had an ablation.    Regular menses? No-ablation    Her current contraception method is: tubal ligation.  She  reports that she has never smoked. She has never used smokeless tobacco.    Patient is sexually active.  STD testing offered?  Declined  Last PHQ-9 score on record =       2025     2:57 PM   PHQ-9 SCORE   PHQ-9 Total Score 0     Last GAD7 score on record =       2025     2:57 PM   SANTIAGO-7 SCORE   Total Score 2     Alcohol Score = 2    HEALTH MAINTENANCE:  Cholesterol:   Cholesterol   Date Value Ref Range Status   2023 209 (H) <200 mg/dL Final   2023 234 (H) <200 mg/dL Final     Last Mammo: One year ago, Result: Normal, Next Mammo: Today   Pap:   Lab Results   Component Value Date    GYNINTERP  2024     Negative for Intraepithelial Lesion or Malignancy (NILM)    PAP  NIL 2019    PAP NIL 10/11/2012      Colonoscopy:  NA, Result: Not applicable, Next Colonoscopy: Age 47 years.  Dexa:  NA    Health maintenance updated:  yes    HISTORY:  OB History    Para Term  AB Living   4 3 3 0 1 3   SAB IAB Ectopic Multiple Live Births   1 0 0 0 3      # Outcome Date GA Lbr Daniel/2nd Weight Sex Type Anes PTL Lv   4 Term 17 39w3d  3.68 kg (8 lb 1.8 oz) F CS-LTranv Spinal  ABDIRIZAK      Name: Carla      Apgar1: 9  Apgar5: 9   3 SAB 10/2014 7w0d          2 Term 12 39w0d  4.394 kg (9 lb 11 oz) F CS-LTranv Spinal  ABDIRIZAK      Name: Erna      Apgar1: 8  Apgar5: 9   1 Term 07/10/10 41w0d  3.771 kg (8 lb 5 oz) M -SEC   ABDIRIZAK      Birth Comments: Post-date induction --Failure to progress.      Name: Mian       Patient Active Problem List   Diagnosis    HSV-1 infection    Migraine    Menorrhagia with regular cycle    Anxiety     Past Surgical History:   Procedure Laterality Date     SECTION       SECTION  2012    Procedure:  SECTION;  REPEAT  SECTION;  Surgeon: Lalo Lopez MD;  [facility]    COMBINED  SECTION, SALPINGECTOMY BILATERAL N/A 2017    Procedure: COMBINED  SECTION, SALPINGECTOMY BILATERAL;  REPEAT  SECTION, BILATERAL SALPINGECTOMY ;  Surgeon: Kayla Campos MD;  Location:  [facility]    cool sculpting  2019    DILATE CERVIX, HYSTEROSCOPY, ABLATE ENDOMETRIUM NOVASURE, COMBINED N/A 2022    Procedure: Diagnostic hysteroscopy novasure endometrial ablation;  Surgeon: Kayla Campos MD;  Location:  [facility]      Social History     Tobacco Use    Smoking status: Never    Smokeless tobacco: Never   Substance Use Topics    Alcohol use: Yes     Comment: Couple times per week, glasses of wine      Problem (# of Occurrences) Relation (Name,Age of Onset)    Cancer (1) Father: Father had multiple mylema and passed away 2016.    Hyperlipidemia (2) Mother, Sister    Heart  "Surgery (1) Daughter (1): 2018-had a AFT (extra valve that didnt close after birth)    No Known Problems (6) Brother, Maternal Grandmother, Maternal Grandfather, Paternal Grandmother, Paternal Grandfather, Other              Current Outpatient Medications   Medication Sig Dispense Refill    acetaminophen (TYLENOL) 325 MG tablet Take 3 tablets (975 mg) by mouth every 6 hours as needed for mild pain 50 tablet 0    cetirizine (ZYRTEC) 5 MG tablet Take 5 mg by mouth daily      ibuprofen (ADVIL/MOTRIN) 800 MG tablet Take 1 tablet (800 mg) by mouth every 6 hours as needed for other (mild and/or inflammatory pain) 30 tablet 0    MAGNESIUM OXIDE PO       Multiple Vitamins-Minerals (WOMENS MULTI VITAMIN & MINERAL PO)       Semaglutide-Weight Management (WEGOVY) 0.5 MG/0.5ML pen Inject 0.5 mg Subcutaneous once a week      sertraline (ZOLOFT) 50 MG tablet Take 1 tablet (50 mg) by mouth daily. 90 tablet 3     No current facility-administered medications for this visit.     No Known Allergies    Past medical, surgical, social and family histories were reviewed and updated in EPIC.    ROS:   12 point review of systems negative other than symptoms noted below or in the HPI.  No urinary frequency or dysuria, bladder or kidney problems    EXAM:  /70   Ht 1.702 m (5' 7\")   Wt 68 kg (150 lb)   BMI 23.49 kg/m     BMI: Body mass index is 23.49 kg/m .    PHYSICAL EXAM:  Constitutional:   Appearance: Well nourished, well developed, alert, in no acute distress  Neck:  Lymph Nodes:  No lymphadenopathy present    Thyroid:  Gland size normal, nontender, no nodules or masses present  on palpation  Chest:  Respiratory Effort:  Breathing unlabored  Cardiovascular:    Heart: Auscultation:  Regular rate, normal rhythm, no murmurs present  Breasts: Palpation of Breasts and Axillae:  No masses present on palpation, no breast tenderness., Axillary Lymph Nodes:  No lymphadenopathy present., and No nodularity, asymmetry or nipple discharge " bilaterally.  Gastrointestinal:   Abdominal Examination:  Abdomen nontender to palpation, tone normal without rigidity or guarding, no masses present, umbilicus without lesions   Liver and Spleen:  No hepatomegaly present, liver nontender to palpation    Hernias:  No hernias present  Lymphatic: Lymph Nodes:  No other lymphadenopathy present  Skin:  General Inspection:  No rashes present, no lesions present, no areas of  discoloration  Neurologic:    Mental Status:  Oriented X3.  Normal strength and tone, sensory exam                grossly normal, mentation intact and speech normal.    Psychiatric:   Mentation appears normal and affect normal/bright.         Pelvic Exam:  External Genitalia:     Normal appearance for age, no discharge present, no tenderness present, no inflammatory lesions present, color normal  Vagina:     Normal vaginal vault without central or paravaginal defects, no discharge present, no inflammatory lesions present, no masses present  Bladder:     Nontender to palpation  Urethra:   Urethral Body:  Urethra palpation normal, urethra structural support normal   Urethral Meatus:  No erythema or lesions present  Cervix:     Appearance healthy, no lesions present, nontender to palpation, no bleeding present  Uterus:     Uterus: firm, normal sized and nontender, midplane in position.   Adnexa:     No adnexal tenderness present, no adnexal masses present  Perineum:     Perineum within normal limits, no evidence of trauma, no rashes or skin lesions present  Anus:     Anus within normal limits, no hemorrhoids present  Inguinal Lymph Nodes:     No lymphadenopathy present  Pubic Hair:     Normal pubic hair distribution for age  Genitalia and Groin:     No rashes present, no lesions present, no areas of discoloration, no masses present    COUNSELING:   Reviewed preventive health counseling, as reflected in patient instructions  Special attention given to:        Regular exercise       Healthy  diet/nutrition       Colorectal Cancer Screening       (Barbara)menopause management    BMI: Body mass index is 23.49 kg/m .      ASSESSMENT:  44 year old female with satisfactory annual exam.    ICD-10-CM    1. Encounter for gynecological examination without abnormal finding  Z01.419       2. Anxiety  F41.9 sertraline (ZOLOFT) 50 MG tablet          PLAN:  Patient Instructions   Follow up with your primary care provider for your other medical problems.  Continue self breast exam.  Increase physical activity and exercise.  Usual safety and preventative measures counseling done.  Last pap smear (2024) was normal and negative for the DNA of high risk HPV subtypes.  No pap was obtained this year.  This was discussed with the patient and she agrees with the plan.  PHQ-9/SANTIAGO-7 scores were discussed.  Will continue with daily zoloft to help with anxiety.    Discussed multifactorial nature of lower libido.  Discussed the use of testosterone to help with sex drive if interested.  Will keep in mind.      Kayla Campos MD

## 2025-06-24 ENCOUNTER — OFFICE VISIT (OUTPATIENT)
Dept: OBGYN | Facility: CLINIC | Age: 45
End: 2025-06-24
Payer: COMMERCIAL

## 2025-06-24 ENCOUNTER — ANCILLARY PROCEDURE (OUTPATIENT)
Dept: MAMMOGRAPHY | Facility: CLINIC | Age: 45
End: 2025-06-24
Payer: COMMERCIAL

## 2025-06-24 VITALS
HEIGHT: 67 IN | SYSTOLIC BLOOD PRESSURE: 110 MMHG | WEIGHT: 150 LBS | DIASTOLIC BLOOD PRESSURE: 70 MMHG | BODY MASS INDEX: 23.54 KG/M2

## 2025-06-24 DIAGNOSIS — F41.9 ANXIETY: ICD-10-CM

## 2025-06-24 DIAGNOSIS — Z01.419 ENCOUNTER FOR GYNECOLOGICAL EXAMINATION WITHOUT ABNORMAL FINDING: Primary | ICD-10-CM

## 2025-06-24 DIAGNOSIS — Z12.31 VISIT FOR SCREENING MAMMOGRAM: ICD-10-CM

## 2025-06-24 PROCEDURE — 3074F SYST BP LT 130 MM HG: CPT | Performed by: OBSTETRICS & GYNECOLOGY

## 2025-06-24 PROCEDURE — 77067 SCR MAMMO BI INCL CAD: CPT | Mod: TC | Performed by: RADIOLOGY

## 2025-06-24 PROCEDURE — 3078F DIAST BP <80 MM HG: CPT | Performed by: OBSTETRICS & GYNECOLOGY

## 2025-06-24 PROCEDURE — 99396 PREV VISIT EST AGE 40-64: CPT | Performed by: OBSTETRICS & GYNECOLOGY

## 2025-06-24 PROCEDURE — 77063 BREAST TOMOSYNTHESIS BI: CPT | Mod: TC | Performed by: RADIOLOGY

## 2025-06-24 ASSESSMENT — ANXIETY QUESTIONNAIRES
2. NOT BEING ABLE TO STOP OR CONTROL WORRYING: NOT AT ALL
6. BECOMING EASILY ANNOYED OR IRRITABLE: NOT AT ALL
7. FEELING AFRAID AS IF SOMETHING AWFUL MIGHT HAPPEN: NOT AT ALL
1. FEELING NERVOUS, ANXIOUS, OR ON EDGE: SEVERAL DAYS
GAD7 TOTAL SCORE: 2
IF YOU CHECKED OFF ANY PROBLEMS ON THIS QUESTIONNAIRE, HOW DIFFICULT HAVE THESE PROBLEMS MADE IT FOR YOU TO DO YOUR WORK, TAKE CARE OF THINGS AT HOME, OR GET ALONG WITH OTHER PEOPLE: NOT DIFFICULT AT ALL
3. WORRYING TOO MUCH ABOUT DIFFERENT THINGS: NOT AT ALL
5. BEING SO RESTLESS THAT IT IS HARD TO SIT STILL: NOT AT ALL
GAD7 TOTAL SCORE: 2

## 2025-06-24 ASSESSMENT — PATIENT HEALTH QUESTIONNAIRE - PHQ9
5. POOR APPETITE OR OVEREATING: SEVERAL DAYS
SUM OF ALL RESPONSES TO PHQ QUESTIONS 1-9: 0

## 2025-06-24 NOTE — PATIENT INSTRUCTIONS
Follow up with your primary care provider for your other medical problems.  Continue self breast exam.  Increase physical activity and exercise.  Usual safety and preventative measures counseling done.  Last pap smear (2024) was normal and negative for the DNA of high risk HPV subtypes.  No pap was obtained this year.  This was discussed with the patient and she agrees with the plan.  PHQ-9/SANTIAGO-7 scores were discussed.  Will continue with daily zoloft to help with anxiety.    Discussed multifactorial nature of lower libido.  Discussed the use of testosterone to help with sex drive if interested.  Will keep in mind.

## (undated) DEVICE — SOL NACL 0.9% INJ 1000ML BAG 2B1324X

## (undated) DEVICE — SU VICRYL 0 CT 36" J358H

## (undated) DEVICE — GLOVE BIOGEL PI MICRO INDICATOR UNDERGLOVE SZ 6.5 48965

## (undated) DEVICE — PACK C-SECTION LF PL15OTA83B

## (undated) DEVICE — DRSG TELFA ISLAND 4X10"

## (undated) DEVICE — GLOVE BIOGEL PI MICRO SZ 6.0 48560

## (undated) DEVICE — ESU ABLATION NOVASURE ADVANCED NS2013KITUS

## (undated) DEVICE — GLOVE PROTEXIS POWDER FREE 7.0 ORTHOPEDIC 2D73ET70

## (undated) DEVICE — SUCTION CANISTER MEDIVAC LINER 3000ML W/LID 65651-530

## (undated) DEVICE — CATH INTERMITTENT CLEAN-CATH FEMALE 14FR 6" VINYL LF 420614

## (undated) DEVICE — SOL NACL 0.9% IRRIG 1000ML BOTTLE 2F7124

## (undated) DEVICE — ESU GROUND PAD UNIVERSAL W/O CORD

## (undated) DEVICE — PACK TVT HYSTEROSCOPY SMA15HYFSE

## (undated) DEVICE — SOL NACL 0.9% IRRIG 1000ML BOTTLE 07138-09

## (undated) DEVICE — CATH TRAY FOLEY 16FR BARDEX W/DRAIN BAG STATLOCK 300316A

## (undated) DEVICE — BLADE CLIPPER 4406

## (undated) DEVICE — SOL WATER IRRIG 1000ML BOTTLE 2F7114

## (undated) DEVICE — SU VICRYL 3-0 SH 27" J316H

## (undated) DEVICE — DRSG STERI STRIP 1/2X4" R1547

## (undated) DEVICE — PREP DURAPREP 26ML APL 8630

## (undated) DEVICE — GLOVE PROTEXIS W/NEU-THERA 6.0  2D73TE60

## (undated) DEVICE — LINEN TOWEL PACK X5 5464

## (undated) DEVICE — SU MONOCRYL 4-0 PS-2 18" UND Y496G

## (undated) DEVICE — LINEN C-SECTION 5415

## (undated) DEVICE — SU PDS II 0 CTX 60" Z990G

## (undated) RX ORDER — ACETAMINOPHEN 325 MG/1
TABLET ORAL
Status: DISPENSED
Start: 2022-11-30

## (undated) RX ORDER — PROPOFOL 10 MG/ML
INJECTION, EMULSION INTRAVENOUS
Status: DISPENSED
Start: 2022-11-30

## (undated) RX ORDER — KETOROLAC TROMETHAMINE 30 MG/ML
INJECTION, SOLUTION INTRAMUSCULAR; INTRAVENOUS
Status: DISPENSED
Start: 2022-11-30

## (undated) RX ORDER — ONDANSETRON 2 MG/ML
INJECTION INTRAMUSCULAR; INTRAVENOUS
Status: DISPENSED
Start: 2022-11-30

## (undated) RX ORDER — OXYCODONE HYDROCHLORIDE 5 MG/1
TABLET ORAL
Status: DISPENSED
Start: 2022-11-30

## (undated) RX ORDER — FENTANYL CITRATE 50 UG/ML
INJECTION, SOLUTION INTRAMUSCULAR; INTRAVENOUS
Status: DISPENSED
Start: 2022-11-30

## (undated) RX ORDER — DEXAMETHASONE SODIUM PHOSPHATE 4 MG/ML
INJECTION, SOLUTION INTRA-ARTICULAR; INTRALESIONAL; INTRAMUSCULAR; INTRAVENOUS; SOFT TISSUE
Status: DISPENSED
Start: 2022-11-30

## (undated) RX ORDER — FENTANYL CITRATE 0.05 MG/ML
INJECTION, SOLUTION INTRAMUSCULAR; INTRAVENOUS
Status: DISPENSED
Start: 2022-11-30